# Patient Record
Sex: MALE | Race: BLACK OR AFRICAN AMERICAN | Employment: FULL TIME | URBAN - METROPOLITAN AREA
[De-identification: names, ages, dates, MRNs, and addresses within clinical notes are randomized per-mention and may not be internally consistent; named-entity substitution may affect disease eponyms.]

---

## 2021-12-13 ENCOUNTER — OFFICE VISIT (OUTPATIENT)
Dept: FAMILY MEDICINE CLINIC | Age: 31
End: 2021-12-13
Payer: COMMERCIAL

## 2021-12-13 VITALS
TEMPERATURE: 97 F | HEART RATE: 74 BPM | DIASTOLIC BLOOD PRESSURE: 78 MMHG | SYSTOLIC BLOOD PRESSURE: 127 MMHG | OXYGEN SATURATION: 96 % | HEIGHT: 70 IN | WEIGHT: 227 LBS | BODY MASS INDEX: 32.5 KG/M2 | RESPIRATION RATE: 18 BRPM

## 2021-12-13 DIAGNOSIS — Z78.9 HISTORY OF INCARCERATION: ICD-10-CM

## 2021-12-13 DIAGNOSIS — Z11.4 ENCOUNTER FOR SCREENING FOR HIV: ICD-10-CM

## 2021-12-13 DIAGNOSIS — Z11.59 ENCOUNTER FOR HEPATITIS C SCREENING TEST FOR LOW RISK PATIENT: ICD-10-CM

## 2021-12-13 DIAGNOSIS — K64.4 EXTERNAL HEMORRHOID, BLEEDING: ICD-10-CM

## 2021-12-13 DIAGNOSIS — Z76.89 ENCOUNTER TO ESTABLISH CARE: Primary | ICD-10-CM

## 2021-12-13 DIAGNOSIS — K92.1 MELENA: ICD-10-CM

## 2021-12-13 DIAGNOSIS — Z72.0 TOBACCO USE: ICD-10-CM

## 2021-12-13 PROCEDURE — 36415 COLL VENOUS BLD VENIPUNCTURE: CPT | Performed by: FAMILY MEDICINE

## 2021-12-13 PROCEDURE — 99203 OFFICE O/P NEW LOW 30 MIN: CPT | Performed by: FAMILY MEDICINE

## 2021-12-13 PROCEDURE — G0463 HOSPITAL OUTPT CLINIC VISIT: HCPCS | Performed by: FAMILY MEDICINE

## 2021-12-13 PROCEDURE — 99212 OFFICE O/P EST SF 10 MIN: CPT | Performed by: FAMILY MEDICINE

## 2021-12-13 RX ORDER — DIAPER,BRIEF,INFANT-TODD,DISP
EACH MISCELLANEOUS
Qty: 30 G | Refills: 1 | Status: SHIPPED | OUTPATIENT
Start: 2021-12-13 | End: 2021-12-20

## 2021-12-13 RX ORDER — POLYETHYLENE GLYCOL 3350 17 G/17G
17 POWDER, FOR SOLUTION ORAL DAILY
Qty: 510 G | Refills: 0 | Status: SHIPPED | OUTPATIENT
Start: 2021-12-13 | End: 2022-01-12

## 2021-12-13 ASSESSMENT — ENCOUNTER SYMPTOMS
WHEEZING: 0
NAUSEA: 0
ANAL BLEEDING: 1
DIARRHEA: 0
SHORTNESS OF BREATH: 0
RECTAL PAIN: 1
BLOOD IN STOOL: 1
COUGH: 0
ABDOMINAL PAIN: 1
VOMITING: 0
CONSTIPATION: 1

## 2021-12-13 ASSESSMENT — PATIENT HEALTH QUESTIONNAIRE - PHQ9
SUM OF ALL RESPONSES TO PHQ9 QUESTIONS 1 & 2: 0
SUM OF ALL RESPONSES TO PHQ QUESTIONS 1-9: 0
2. FEELING DOWN, DEPRESSED OR HOPELESS: 0
SUM OF ALL RESPONSES TO PHQ QUESTIONS 1-9: 0
SUM OF ALL RESPONSES TO PHQ QUESTIONS 1-9: 0
1. LITTLE INTEREST OR PLEASURE IN DOING THINGS: 0

## 2021-12-13 NOTE — PROGRESS NOTES
S: 27 y.o. male with history of incarceration. To establish care. Rectal bleeding, abdominal pain. Dark and bloody stools. Abdominal pain, cramping, 2-3 BMs per day. Some rectal pain. No personal or family history of IBD or CA. No NSAIDs. No previous scopes. More than one year. Tobacco use. Thinking of quitting, declines assistance. No current drugs. THC past.  Sexually active one female partner. O: VS: /78 (Site: Left Upper Arm, Position: Sitting, Cuff Size: Large Adult)   Pulse 74   Temp 97 °F (36.1 °C) (Temporal)   Resp 18   Ht 5' 10\" (1.778 m)   Wt 227 lb (103 kg)   SpO2 96%   BMI 32.57 kg/m²    General: NAD, appropriate affect and grooming   CV:  RRR, no gallops, rubs, or murmurs   Resp: CTAB   Abd:  Soft, mild tenderness RLQ. Ext:  No edema  Impression: Rectal bleeding   Plan: Check labs, CBC, Hep C, HIV, referral to surgery. Bowel regimen. Close follow up, 4-6 weeks. Attending Physician Statement  I have discussed the case, including pertinent history and exam findings with the resident. I also have seen the patient and performed key portions of the examination. I agree with the documented assessment and plan.

## 2021-12-13 NOTE — PROGRESS NOTES
[facility]  FAMILY MEDICINE RESIDENCY PROGRAM  DATE OF VISIT : 2021    Patient : Glenis Found   Age : 27 y.o.  : 1990   MRN : <[id number]>   ______________________________________________________________________    Chief Complaint :   Chief Complaint   Patient presents with   Weston Fallow Patient    Rectal Bleeding    Abdominal Pain       HPI : Glenis Found is 27 y.o. male who presented to the clinic today for establishment of care. Blood in stool:  Tarry stools, blood mixed in with stool. Over 1 year now. Has been occurring more frequently. Some abdominal pain, cramping. No N/V. Three times a day bowel movements. Some constipation, no diarrhea. No burning. Some rectal pain after BMs. Varying location, tends to be LUQ at times though. No surgeries, no scopes. No history of IBD, IBS. No [address] IBD. No dizziness, chest pain, shortness of breath. No fiber supplement. No frequent NSAIDs. Tobacco use:  Smokes 4 to 5 cigarettes or cigars a day. Contemplative towards quitting, has quit in the past.  Declines assistance at this time. Patient's medications, allergies, past medical, surgical, social and family histories were reviewed and updated as appropriate. Past Medical History :  Past Medical History:   Diagnosis Date    Cardiac murmur     \"innocent as child\"     No past surgical history on file. Review of Systems :    ROS - Per HPI   Review of Systems   Constitutional: Negative for chills, fever and unexpected weight change. Respiratory: Negative for cough, shortness of breath and wheezing. Cardiovascular: Negative for chest pain, palpitations and leg swelling. Gastrointestinal: Positive for abdominal pain, anal bleeding, blood in stool, constipation and rectal pain. Negative for diarrhea, nausea and vomiting. Genitourinary: Negative for dysuria, frequency and hematuria. Skin: Negative for pallor and rash.    Neurological: Negative for dizziness, syncope, light-headedness and headaches. Psychiatric/Behavioral: Negative for behavioral problems. The patient is not nervous/anxious. ______________________________________________________________________    Physical Exam :    Wt Readings from Last 3 Encounters:   12/13/21 227 lb (103 kg)   01/31/17 198 lb (89.8 kg)   08/06/16 190 lb (86.2 kg)       BMI Readings from Last 3 Encounters:   12/13/21 32.57 kg/m²   01/31/17 28.41 kg/m²   08/06/16 27.26 kg/m²   ]      Vitals: /78 (Site: Left Upper Arm, Position: Sitting, Cuff Size: Large Adult)   Pulse 74   Temp 97 °F (36.1 °C) (Temporal)   Resp 18   Ht 5' 10\" (1.778 m)   Wt 227 lb (103 kg)   SpO2 96%   BMI 32.57 kg/m²     Physical Exam  Constitutional:       General: He is not in acute distress. Appearance: He is well-developed. HENT:      Head: Normocephalic and atraumatic. Eyes:      Conjunctiva/sclera: Conjunctivae normal.      Pupils: Pupils are equal, round, and reactive to light. Neck:      Thyroid: No thyromegaly. Trachea: No tracheal deviation. Cardiovascular:      Rate and Rhythm: Normal rate and regular rhythm. Heart sounds: Normal heart sounds. No murmur heard. Pulmonary:      Effort: Pulmonary effort is normal. No respiratory distress. Breath sounds: Normal breath sounds. No wheezing or rales. Abdominal:      General: Bowel sounds are normal. There is no distension. Palpations: Abdomen is soft. There is no mass. Tenderness: There is abdominal tenderness (mild, RLQ). There is no guarding or rebound. Hernia: No hernia is present. Genitourinary:     Comments: Dr. Huitron Pall present as chaperone. External hemorrhoid noted at 6 o'clock position. No internal hemorrhoids appreciated. Scant stool sample obtained, guaiac negative  Musculoskeletal:         General: Normal range of motion. Cervical back: Normal range of motion and neck supple. Lymphadenopathy:      Cervical: No cervical adenopathy. Skin:     General: Skin is warm and dry. Capillary Refill: Capillary refill takes less than 2 seconds. Findings: No rash. Neurological:      Mental Status: He is alert and oriented to person, place, and time. Cranial Nerves: No cranial nerve deficit. Deep Tendon Reflexes: Reflexes normal.   Psychiatric:         Behavior: Behavior normal.         ______________________________________________________________________    Assessment & Plan :     Diagnosis Orders   1. Encounter to establish care     2. Roney Kapoor MD, General Surgery, [place]    CBC   3. Tobacco use     4. Encounter for hepatitis C screening test for low risk patient  HEPATITIS C ANTIBODY   5. Encounter for screening for HIV  HIV Screen   6. External hemorrhoid, bleeding  Rebeca Schilling, Dayn Jang MD, General Surgery, [place]    polyethylene glycol [facility]) 17 GM/SCOOP powder    hydrocortisone (ALA-YENIFER) 1 % cream   7. History of incarceration         Establishment of care: Past medical, surgical, family, social history reviewed and documented and updated in chart as appropriate. Labs as ordered. Blood in stool/pain: External hemorrhoid noted on examination, other differentials including IBD versus IBS versus ulcer. Preparation H and MiraLAX sent to pharmacy for symptomatic relief and bowel regularity. Referral to general surgery for possible scopes. Check CBC to evaluate hemoglobin level and establish baseline. Tobacco use: Declines assistance with cessation at this time. Health maintenance: Received Covid immunizations while incarcerated, hep C and HIV screen      Follow up:  Return in about 6 weeks (around 1/24/2022) for follow up on symptoms.       Juliann York MD PGY-3    Discussed with: Dr. Wili Morton

## 2021-12-14 ENCOUNTER — TELEPHONE (OUTPATIENT)
Dept: SURGERY | Age: 31
End: 2021-12-14

## 2021-12-22 ENCOUNTER — HOSPITAL ENCOUNTER (EMERGENCY)
Age: 31
Discharge: HOME | End: 2021-12-22
Payer: COMMERCIAL

## 2021-12-22 VITALS
DIASTOLIC BLOOD PRESSURE: 85 MMHG | SYSTOLIC BLOOD PRESSURE: 125 MMHG | HEART RATE: 62 BPM | RESPIRATION RATE: 14 BRPM | TEMPERATURE: 98 F | OXYGEN SATURATION: 96 %

## 2021-12-22 DIAGNOSIS — R36.9 PENILE DISCHARGE: ICD-10-CM

## 2021-12-22 DIAGNOSIS — Z20.2 STD EXPOSURE: Primary | ICD-10-CM

## 2021-12-22 PROCEDURE — 99284 EMERGENCY DEPT VISIT MOD MDM: CPT

## 2021-12-22 PROCEDURE — 87491 CHLMYD TRACH DNA AMP PROBE: CPT

## 2021-12-22 PROCEDURE — 6370000000 HC RX 637 (ALT 250 FOR IP): Performed by: PHYSICIAN ASSISTANT

## 2021-12-22 PROCEDURE — 86803 HEPATITIS C AB TEST: CPT

## 2021-12-22 PROCEDURE — 87591 N.GONORRHOEAE DNA AMP PROB: CPT

## 2021-12-22 PROCEDURE — 6360000002 HC RX W HCPCS: Performed by: PHYSICIAN ASSISTANT

## 2021-12-22 PROCEDURE — 86703 HIV-1/HIV-2 1 RESULT ANTBDY: CPT

## 2021-12-22 PROCEDURE — 96372 THER/PROPH/DIAG INJ SC/IM: CPT

## 2021-12-22 RX ORDER — AZITHROMYCIN 250 MG/1
1000 TABLET, FILM COATED ORAL ONCE
Status: COMPLETED | OUTPATIENT
Start: 2021-12-22 | End: 2021-12-22

## 2021-12-22 RX ORDER — CEFTRIAXONE 1 G/1
500 INJECTION, POWDER, FOR SOLUTION INTRAMUSCULAR; INTRAVENOUS ONCE
Status: COMPLETED | OUTPATIENT
Start: 2021-12-22 | End: 2021-12-22

## 2021-12-22 RX ADMIN — CEFTRIAXONE 500 MG: 1 INJECTION, POWDER, FOR SOLUTION INTRAMUSCULAR; INTRAVENOUS at 12:51

## 2021-12-22 RX ADMIN — AZITHROMYCIN 1000 MG: 250 TABLET, FILM COATED ORAL at 12:51

## 2021-12-22 NOTE — ED PROVIDER NOTES
[facility]       Department of Emergency Medicine   ED  Provider Note  Admit Date/RoomTime: 12/22/2021 12:15 PM  ED Room: [place] [address]        Date/Time Seen:  12/22/21    Chief Complaint:  Exposure to STD      History of Present Illness:  Source of history provided by:  patient. History/Exam Limitations: none. Mary Villeda is a 27 y.o. old male presenting to the emergency department for possible exposure to STD with dysuria and penile discharge, which occured 1 week(s) prior to arrival.  Since exposure/onset the symptoms have been persistent and mild-moderate in severity. He denies any genital rash, genital ulcers, scrotal mass or scrotal pain. His prior STD history: GC and chlamydia. Review of Systems:     Pertinent positives and negatives are stated within HPI, all other systems reviewed and are negative. Past Medical History:  has a past medical history of Cardiac murmur. Past Surgical History:  has a past surgical history that includes [place] tooth extraction. Social History:  reports that he has been smoking cigars. He has never used smokeless tobacco. He reports current drug use. Drug: Marijuana [place]). He reports that he does not drink alcohol. Family History: family history includes Other in his father. Allergies: Patient has no known allergies. Physical Exam:  (Vital signs reviewed)  Constitutional:  Alert, development consistent with age. Eyes:  PERRL, EOMI, no discharge or conjunctival injection. Ears:  External ears without lesions. Throat:  Pharynx without injection, exudate, or tonsillar hypertrophy. Airway patient. Neck:  Normal ROM. Supple. Lungs:  Clear to auscultation and breath sounds equal.  Heart:  Regular rate and rhythm, normal heart sounds, without pathological murmurs, ectopy, gallops, or rubs. Abdomen:  Soft, nontender, good bowel sounds. No firm or pulsatile mass. Genitalia Exam:        Penis: declined per patient. Scrotum: declined per patient. Testicle: declined per patient. Skin:  Normal turgor. Warm, dry, without visible rash, unless noted elsewhere. Neurological:  Orientation age-appropriate. Motor functions intact.    -------------------Nursing Notes / Prior Records & Vitals Reviewed Section----------------------   (The nursing notes within the ED encounter, home medications, current encounter or past encounter records and vital signs as below have been reviewed)   /85   Pulse 62   Temp 98 °F (36.7 °C) (Oral)   Resp 14   SpO2 96%   Oxygen Saturation Interpretation: Normal.  -------------------------------------------Test Results Section---------------------------------------------  (All laboratory and radiology results have been personally reviewed by myself)  Laboratory:  Results for orders placed or performed during the hospital encounter of 12/22/21   C.trachomatis N.gonorrhoeae DNA, Urine    Specimen: Urine   Result Value Ref Range    Source Urine        Radiology: All Radiology results interpreted by Radiologist unless otherwise noted. No orders to display     -----------------------------ED Course / Medical Decision Making Section--------------------------  ED Course Medications:  Medications   azithromycin (ZITHROMAX) tablet 1,000 mg (1,000 mg Oral Given 12/22/21 1251)   cefTRIAXone (ROCEPHIN) injection 500 mg (500 mg IntraMUSCular Given 12/22/21 1251)         Medical Decision Making:    Concerns for STD, based on concerns for hx, will treat for suspected STD and provide outpatient follow-up instructions. Counseled on safe sex practices, recommended importance for further STD/HIV/ testing by PCP and importance of PCP follow up. Patient expressed an understanding    Counseling: The emergency provider has spoken with the patient and discussed todays results, in addition to providing specific details for the plan of care and counseling regarding the diagnosis and prognosis.   Questions are answered at this time and they are agreeable with the plan.  ------------------------------------Impression & Disposition Section------------------------------------  Impression(s):  1. STD exposure    2. Penile discharge      Disposition:  Disposition: Discharge to home  Patient condition is good    Discharge Medication List as of 12/22/2021  1:17 PM        * NOTE: This report was transcribed using voice recognition software. Every effort was made to ensure accuracy; however, inadvertent computerized transcription errors may be present.         Gela Bolanos, [place]  12/22/21 1535

## 2021-12-23 LAB
HEPATITIS C ANTIBODY INTERPRETATION: NORMAL
HIV-1 AND HIV-2 ANTIBODIES: NORMAL

## 2021-12-27 LAB
C. TRACHOMATIS DNA ,URINE: NEGATIVE
N. GONORRHOEAE DNA, URINE: NEGATIVE
SOURCE: NORMAL

## 2022-01-06 ENCOUNTER — TELEPHONE (OUTPATIENT)
Dept: SURGERY | Age: 32
End: 2022-01-06

## 2022-01-06 ENCOUNTER — PREP FOR PROCEDURE (OUTPATIENT)
Dept: SURGERY | Age: 32
End: 2022-01-06

## 2022-01-06 ENCOUNTER — OFFICE VISIT (OUTPATIENT)
Dept: SURGERY | Age: 32
End: 2022-01-06
Payer: COMMERCIAL

## 2022-01-06 VITALS
SYSTOLIC BLOOD PRESSURE: 144 MMHG | RESPIRATION RATE: 16 BRPM | BODY MASS INDEX: 32.64 KG/M2 | HEIGHT: 70 IN | TEMPERATURE: 99.5 F | WEIGHT: 228 LBS | OXYGEN SATURATION: 96 % | HEART RATE: 77 BPM | DIASTOLIC BLOOD PRESSURE: 84 MMHG

## 2022-01-06 DIAGNOSIS — K62.5 RECTAL BLEEDING: Primary | ICD-10-CM

## 2022-01-06 DIAGNOSIS — K59.1 FUNCTIONAL DIARRHEA: ICD-10-CM

## 2022-01-06 DIAGNOSIS — R10.30 LOWER ABDOMINAL PAIN: ICD-10-CM

## 2022-01-06 PROBLEM — Z78.9 HISTORY OF INCARCERATION: Status: RESOLVED | Noted: 2021-12-13 | Resolved: 2022-01-06

## 2022-01-06 PROBLEM — K64.4 EXTERNAL HEMORRHOID, BLEEDING: Status: RESOLVED | Noted: 2021-12-13 | Resolved: 2022-01-06

## 2022-01-06 PROCEDURE — 99202 OFFICE O/P NEW SF 15 MIN: CPT | Performed by: SURGERY

## 2022-01-06 PROCEDURE — 99203 OFFICE O/P NEW LOW 30 MIN: CPT | Performed by: SURGERY

## 2022-01-06 PROCEDURE — G0463 HOSPITAL OUTPT CLINIC VISIT: HCPCS | Performed by: SURGERY

## 2022-01-06 PROCEDURE — 99212 OFFICE O/P EST SF 10 MIN: CPT | Performed by: SURGERY

## 2022-01-06 RX ORDER — SODIUM CHLORIDE 9 MG/ML
25 INJECTION, SOLUTION INTRAVENOUS PRN
Status: CANCELLED | OUTPATIENT
Start: 2022-01-06

## 2022-01-06 RX ORDER — BISACODYL 5 MG
TABLET, DELAYED RELEASE (ENTERIC COATED) ORAL
Qty: 8 TABLET | Refills: 0 | Status: SHIPPED
Start: 2022-01-06 | End: 2022-03-10

## 2022-01-06 RX ORDER — SODIUM CHLORIDE 0.9 % (FLUSH) 0.9 %
10 SYRINGE (ML) INJECTION EVERY 12 HOURS SCHEDULED
Status: CANCELLED | OUTPATIENT
Start: 2022-01-06

## 2022-01-06 RX ORDER — SODIUM CHLORIDE 0.9 % (FLUSH) 0.9 %
10 SYRINGE (ML) INJECTION PRN
Status: CANCELLED | OUTPATIENT
Start: 2022-01-06

## 2022-01-06 RX ORDER — SODIUM CHLORIDE, SODIUM LACTATE, POTASSIUM CHLORIDE, CALCIUM CHLORIDE 600; 310; 30; 20 MG/100ML; MG/100ML; MG/100ML; MG/100ML
INJECTION, SOLUTION INTRAVENOUS CONTINUOUS
Status: CANCELLED | OUTPATIENT
Start: 2022-01-06

## 2022-01-06 NOTE — Clinical Note
See my office note from today on your patient. Thanks!!     Electronically signed by Antonietta Phan MD on 1/6/2022 at 3:47 PM

## 2022-01-06 NOTE — PROGRESS NOTES
History and Physical    Patient's Name/Date of Birth: Rosaura White /1990, (27 y.o.), male    Date: January 6, 2022     Assessment/Plan:  1. Rectal bleeding, intermittent lower abdominal pain, and chronic diarrhea - I did not see or feel any external hemorrhoids or palpate any internal hemorrhoids. I am not sure that the Miralax is helpful at this point. I recommended diagnostic colonoscopy with possible biopsy or polypectomy and explained the risk, benefits, expected outcome, and alternatives to the procedure. Risks included but are not limited to bleeding, infection, respiratory distress, hypotension, and perforation of the colon. The patient understands and is in agreement. 2. Heartburn and indigestion - I recommended esophagogastroduodenoscopy with possible biopsy and explained the risk, benefits, expected outcome, and alternatives to the procedure. Risks included but are not limited to bleeding, infection, respiratory distress, hypotension, and perforation of the esophagus, stomach, or duodenum. Patient understands and is in agreement. 3. Tobacco abuse    Chief Complaint   Patient presents with    Melena     pt states that for the last 6 months he has been having issues with black stools    Hemorrhoids     pt states that he has been having issues for the last 6 months as well       HPI:   Patient was seen in the office today for 6-month history of rectal bleeding. He has been having dark to bright red blood that is in the commode mixed with the stool as well as in the water and also on the tissue when he wipes. This is been going on for about 6 months. Initially has been intermittent but over the last month has been with nearly every bowel movement. He also has intermittent lower abdominal pain that migrates from right lower quadrant to the left lower quadrant. It occurs a few times a week but only lasts a few minutes at a time. He rates the severity at a 6 out of 10.   He has 3-4 BMs per day ranging from formed stool to liquidy stool. He was started on Miralax 17 g p.o. daily about 1 month ago however this is not changed his bowel habits significantly. He denied any family history of colon cancer or colon polyps. He has intermittent heartburn or indigestion a few times a week but is not taking thing for it. He denies any nausea, vomiting, upper abdominal pain, or weight loss. Past Medical History:   Diagnosis Date    Cardiac murmur     \"innocent as child\"       Past Surgical History:   Procedure Laterality Date    WISDOM TOOTH EXTRACTION      general anesthesia, tolerated well       Current Outpatient Medications   Medication Sig Dispense Refill    Multiple Vitamins-Minerals (VITAMIN-MINERAL SUPPLEMENT PO) Take 1 tablet by mouth daily      hydrocortisone 2.5 % cream Apply 1 applicator topically once Apply to anal area daily      polyethylene glycol (GLYCOLAX) 17 GM/SCOOP powder Take 17 g by mouth daily 510 g 0     No current facility-administered medications for this visit. No Known Allergies    Review of Systems  Non-contributory    Physical Exam:  Vitals:    01/06/22 1421   BP: (!) 144/84   Site: Left Upper Arm   Position: Sitting   Cuff Size: Large Adult   Pulse: 77   Resp: 16   Temp: 99.5 °F (37.5 °C)   TempSrc: Infrared   SpO2: 96%   Weight: 228 lb (103.4 kg)   Height: 5' 10\" (1.778 m)       Body mass index is 32.71 kg/m². Physical Exam  Constitutional:       General: He is not in acute distress. Appearance: He is well-developed. He is not diaphoretic. HENT:      Head: Normocephalic and atraumatic. Eyes:      General:         Right eye: No discharge. Left eye: No discharge. Cardiovascular:      Rate and Rhythm: Normal rate and regular rhythm. Heart sounds: Normal heart sounds. No murmur heard. No friction rub. No gallop. Pulmonary:      Effort: Pulmonary effort is normal. No respiratory distress. Breath sounds: Normal breath sounds.  No wheezing or rales. Chest:      Chest wall: No tenderness. Abdominal:      General: Bowel sounds are normal. There is no distension. Palpations: Abdomen is soft. Abdomen is not rigid. There is no mass. Tenderness: There is abdominal tenderness (mild RLQ and LLQ tenderness to palpation). There is no guarding or rebound. Hernia: There is no hernia in the ventral area, left inguinal area or right inguinal area. Genitourinary:     Penis: Normal.       Testes:         Right: Mass, tenderness or swelling not present. Left: Mass, tenderness or swelling not present. Prostate: Not enlarged and not tender. Rectum: Guaiac stool: no stool available for hemoccult testing. No mass, tenderness, anal fissure, external hemorrhoid or internal hemorrhoid. Normal anal tone. Musculoskeletal:         General: No deformity. Normal range of motion. Cervical back: Normal range of motion. Skin:     General: Skin is warm and dry. Coloration: Skin is not pale. Findings: No erythema or rash. Neurological:      Mental Status: He is alert and oriented to person, place, and time.    Psychiatric:         Behavior: Behavior normal.         Judgment: Judgment normal.     Electronically signed by Joel Vazquez MD on 1/6/22 at 2:42 PM EST

## 2022-01-06 NOTE — PROGRESS NOTES
Scheduled pt for Colonoscopy on 2/22/22 at 8:15 AM. Pt needs to arrive at [address] at 7:00 AM. Mariano Schneider pt directions and instruction sheet in office. Pt accepted date/time and verbalized understanding.     Electronically signed by Carrolyn Siemens on 1/6/22 at 3:29 PM EST

## 2022-01-06 NOTE — PATIENT INSTRUCTIONS
Dr. Jerad Haynes recommended upper GI endoscopy and colonoscopy with possible biopsy or polypectomy and he explained the risk, benefits, expected outcome, and alternatives to the procedure. Risks included but are not limited to bleeding, infection, respiratory distress, hypotension, and perforation of the esophagus, stomach, small intestine, or colon. You understood and were in agreement. You will need to have someone bring you to the hospital and take you home because you will not be able to drive or work the rest of that day. Also, you need to have someone stay with you the rest of the day to make sure you do not develop any complications. [facility] General Surgery  MAGNESIUM CITRATE/DULCOLAX TABLETS  COLON PREP FOR COLONOSCOPY OR COLON SURGERY    It is very important that you follow all of the instructions listed on this sheet carefully (they may be slightly different than the directions on the product that you purchase at the pharmacy) to ensure that your colon is adequately cleaned out or your risk of complications could be increased. 2 Days or More Before Endoscopy:   Obtain three 10-ounce bottle of Magnesium Citrate and 1 bottle of Dulcolax tablets from the pharmacy.  Do not eat corn, tomatoes, peas or watermelon 5 days before procedure.  If you are on INSULIN or OTHER DIABETIC MEDICATIONS, then check with your primary care physician as to how to adjust your medication while on clear liquid diet and when nothing by mouth. 1 Day Before the Endoscopy:   No solid food  only clear liquids (soup, jello, or juice that you can see through with no solid food) for breakfast, lunch and supper. DO NOT drink or eat anything that is red as it will turn the inside of the colon red and look like blood.  Have at least 8 oz or more of clear liquids for breakfast (7 am to 8 am) and lunch (11:30 am to 12:30 pm).    12 Noon Drink a 10 oz bottle of Magnesium Citrate and 4 Dulcolax tablets followed immediately by at least 8 oz of clear liquids.  1:00 pm Drink at least 8 oz of clear liquids.  2:00 pm Take 4 Dulcolax tablets and drink at least 8 oz of clear liquids.  3:00 pm Drink at least 8 oz of clear liquids.  4:00 pm Drink a 10 oz bottle of Magnesium Citrate followed immediately by at least 8 oz of clear liquids.  5:00 pm Drink at least 8 oz of clear liquids.  Can continue to take liquids until 12 midnight then nothing to eat or drink except as instructed below    Day of Endoscopy:   4 hours prior to scheduled time for colonoscopy, drink a 10 oz bottle of Magnesium Citrate followed immediately by at least 8 oz of clear liquids. Then nothing to drink after that.  If any blood pressure medications or heart medications are due in the morning, you should take them with a sip of water. Patient Information and Instructions for  Upper GI Endoscopy or Esophagogastroduodenoscopy [EGD])         Definition Upper GI Endoscopy or Esophagogastroduodenoscopy [EGD])  This is a test that uses a fiberoptic scope to examine the esophagus (throat), stomach, and upper part of the small intestines. Upper GI endoscopy may be recommended if you have:   Abdominal pain   Severe heartburn   Persistent nausea and vomiting   Difficulty swallowing   Blood in stool or vomit   Abnormal x-ray or other examinations of the gastrointestinal tract     Conditions that can be diagnosed with upper GI endoscopy include:   Ulcers   Tumors   Polyps   Abnormal narrowing   Inflammation     Possible Complications   Complications are rare, but no procedure is completely free of risk.  If you are planning to have upper GI endoscopy, your doctor will review a list of possible complications, which may include:   Bleeding   Damage to the esophagus, stomach, or intestine   Infection   Respiratory depression (reduced breathing rate and/or depth)   Reaction to sedatives or anesthesia causing your blood pressure to drop    Some factors that may increase the risk of complications include:   Age: 61 or older   Pregnancy   Obesity   Smoking , alcoholism , or drug use   Malnutrition   Recent illness   Diabetes   Heart or lung problems   Bleeding disorders   Use of certain medicines     Be sure to discuss these risks with your doctor before the test.     What to Expect   Prior to test   Leading up to the test:   Arrange for a ride home after the test. Also, arrange for help at home. The night before, eat a light meal.  Do not eat or drink anything after midnight the night before the test.   Talk to your doctor about your medicines. You may be asked to stop taking some medicines up to one week before the procedure, like:   Anti-inflammatory drugs (e.g., aspirin )   Blood thinners, like clopidogrel (Plavix) or warfarin (Coumadin)     Description of the Test   You will be asked to lie on your left side. You will have monitors tracking your breathing, heart rate, and blood oxygen levels. You will be given supplemental oxygen to breathe through your nose. A mouthpiece will be positioned to help keep your mouth open. Your throat may be sprayed with a numbing medicine. You will be given a sedative through an IV to help you relax during the test.  During the test, a small suction tube will be used to clear saliva and fluids from your mouth. The endoscope will be lubricated and placed in your mouth. You will be asked to try to swallow it. Then, it will be carefully and slowly advanced down your throat. It will be passed through your esophagus and into your stomach and intestine. While the endoscope is being advanced, your doctor will view the images on the screen. Air will be passed through the endoscope into your digestive tract. This will be done to smooth the normal folds in the tissues, allowing your doctor to view the tissue more easily. Tiny tools may be passed through the endoscope in order to take biopsies or do other tests.      After Test After the test, you will be observed for an hour. Then, you will be allowed to go home. When you return home after the test, do the following to help ensure a smooth recovery:   Rest when you get home. Ask your doctor if you can resume your normal diet. In most cases, you will be able to. Sedatives can slow your reaction time. Do not drive or use machinery for the rest of the day. Avoid alcohol for the rest of the day. Be sure to follow your doctor's instructions . How Long Will It Take? Usually about 10-15 minutes     Will It Hurt? Most people do not feel anything during the test and will not remember the test.  After the test, your throat may be sore and you may feel bloated. Results   This test gives your doctor information about the health of your digestive system. The results can help to explain your symptoms. You and your doctor will talk about the results and your treatment plan. Call Your Doctor   After the test, call your doctor if any of the following occurs:   Signs of infection, including fever and chills   Severe abdominal pain   Hard, swollen abdomen   Difficulty swallowing or breathing   Any change or increase in your original symptoms   Bloody or black tarry colored stools   Nausea and/or vomiting   Cough, shortness of breath, or chest pain   Bleeding     In case of emergency, call 911. Patient Information and Instructions for Colonoscopy         Definition of Colonoscopy   A colonoscopy is the visual exam of the rectum and colon (large intestine). The exam is done with a tool called a colonoscope. The colonoscope is a flexible tube with a tiny camera on the end. This instrument allows the doctor to view the inside of your rectum and colon. Sigmoidoscopy is a shorter scope that views only the last one third of the colon. Reasons for Colonoscopy   It is used to examine, diagnose, and treat problems in your large intestine.  The procedure is most often done for the following reasons: To determine the cause of abdominal pain, rectal bleeding, or a change in bowel habits   To detect and treat colon cancer or colon polyps   To obtain tissue samples for testing   To stop intestinal bleeding   Monitor response to treatment if you have inflammatory bowel disease     Possible Complications   Complications are rare, but no procedure is completely free of risk. If you are planning to have a colonoscopy, your doctor will review a list of possible complications, which may include:   Bleeding   Reaction to the sedation causing drop in your blood pressure or problems breathing  Perforation or puncture of the bowel     Factors that may increase the risk of complications include:   Pre-existing heart or kidney condition   Treatment with certain medicines, including aspirin and other drugs with anticoagulant or blood-thinning properties   Prior abdominal surgery or radiation treatments   Active colitis , diverticulitis , or other acute bowel disease   Previous treatment with radiation therapy     Be sure to discuss these risks with your doctor before the procedure. What to Expect   Prior to Procedure   Your doctor will likely do the following:   Physical exam   Health history   Review of medicines   Test your stool for hidden blood (called \"occult blood\")     Your colon must be completely clean before the procedure. Any stool left in the intestine will block the view. This preparation may start several days before the procedure. Follow your doctor's instructions. Leading up to your procedure:   Talk to your doctor about your medicines.  You may be asked to stop taking some medicines up to one week before the procedure, like:   Anti-inflammatory drugs (e.g., aspirin )   Blood thinners like clopidogrel (Plavix) or warfarin (Coumadin)   Iron supplements or vitamins containing iron   The day or days before your procedure, go on a clear liquid diet (clear broth, clear juice, clear jello) with no red coloring  Do not eat or drink anything after midnight. Wear comfortable clothing. If you have diabetes, ask your doctor if you need to adjust your diabetes medicine on the day prior to your procedure and the day of your procedure. Arrange for a ride home after the procedure. Anesthesia   You will receive intravenous sedation medicine for the procedure so you will not feel anything during the procedure. Description of the Procedure   You will lie on your left side with knees bent and drawn up toward your chest. The colonoscope will be slowly inserted through the rectum and into the bowel. The colonoscope will inject air into the colon. A small attached video camera will allow the doctor to view the colon's lining on a screen. The doctor will continue guiding the tool through the bowel and assess the lining. A tissue sample or polyps may be removed during the procedure. How Long Will It Take? Usually it takes about 30 to 45 minutes     Will It Hurt? Most people do not feel anything during the procedure and will not remember the procedure. After the procedure, gas pains and cramping are common. These pains should go away with the passing of gas. Post-procedure Care   If any tissue was removed: It will be sent to a lab to be examined. It may take 1-2 weeks for results. The doctor will usually give an initial report after the scope is removed. Other tests may be recommended. A small amount of bleeding may occur during the first few days after the procedure. When you return home after the procedure, be sure to follow your doctor's instructions, which may include:   Resume medicines as instructed by your doctor. Resume normal diet, unless directed otherwise by your doctor. The sedative will make you drowsy. Avoid driving, operating machinery, or making important decisions for the rest of the day. Rest for the remainder of the day.      After arriving home, contact your doctor if any of the following occurs:   Bleeding from your rectum, notify your doctor if you pass a teaspoonful of blood or more. Black, tarry stools   Severe abdominal pain   Hard, swollen abdomen   Signs of infection, including fever or chills   Inability to pass gas or stool   Coughing, shortness of breath, chest pain, severe nausea or vomiting     In case of an emergency, CALL 911 .

## 2022-01-06 NOTE — H&P
History and Physical    Patient's Name/Date of Birth: Lien dEwards /1990, (27 y.o.), male    Date: January 6, 2022     Assessment/Plan:  1. Rectal bleeding, intermittent lower abdominal pain, and chronic diarrhea - I did not see or feel any external hemorrhoids or palpate any internal hemorrhoids. I am not sure that the Miralax is helpful at this point. I recommended diagnostic colonoscopy with possible biopsy or polypectomy and explained the risk, benefits, expected outcome, and alternatives to the procedure. Risks included but are not limited to bleeding, infection, respiratory distress, hypotension, and perforation of the colon. The patient understands and is in agreement. 2. Heartburn and indigestion - I recommended esophagogastroduodenoscopy with possible biopsy and explained the risk, benefits, expected outcome, and alternatives to the procedure. Risks included but are not limited to bleeding, infection, respiratory distress, hypotension, and perforation of the esophagus, stomach, or duodenum. Patient understands and is in agreement. 3. Tobacco abuse    Chief Complaint   Patient presents with    Melena     pt states that for the last 6 months he has been having issues with black stools    Hemorrhoids     pt states that he has been having issues for the last 6 months as well       HPI:   Patient was seen in the office today for 6-month history of rectal bleeding. He has been having dark to bright red blood that is in the commode mixed with the stool as well as in the water and also on the tissue when he wipes. This is been going on for about 6 months. Initially has been intermittent but over the last month has been with nearly every bowel movement. He also has intermittent lower abdominal pain that migrates from right lower quadrant to the left lower quadrant. It occurs a few times a week but only lasts a few minutes at a time. He rates the severity at a 6 out of 10.   He has 3-4 BMs per day ranging from formed stool to liquidy stool. He was started on Miralax 17 g p.o. daily about 1 month ago however this is not changed his bowel habits significantly. He denied any family history of colon cancer or colon polyps. He has intermittent heartburn or indigestion a few times a week but is not taking thing for it. He denies any nausea, vomiting, upper abdominal pain, or weight loss. Past Medical History:   Diagnosis Date    Cardiac murmur     \"innocent as child\"       Past Surgical History:   Procedure Laterality Date    WISDOM TOOTH EXTRACTION      general anesthesia, tolerated well       Current Outpatient Medications   Medication Sig Dispense Refill    Multiple Vitamins-Minerals (VITAMIN-MINERAL SUPPLEMENT PO) Take 1 tablet by mouth daily      hydrocortisone 2.5 % cream Apply 1 applicator topically once Apply to anal area daily      polyethylene glycol (GLYCOLAX) 17 GM/SCOOP powder Take 17 g by mouth daily 510 g 0     No current facility-administered medications for this visit. No Known Allergies    Review of Systems  Non-contributory    Physical Exam:  Vitals:    01/06/22 1421   BP: (!) 144/84   Site: Left Upper Arm   Position: Sitting   Cuff Size: Large Adult   Pulse: 77   Resp: 16   Temp: 99.5 °F (37.5 °C)   TempSrc: Infrared   SpO2: 96%   Weight: 228 lb (103.4 kg)   Height: 5' 10\" (1.778 m)       Body mass index is 32.71 kg/m². Physical Exam  Constitutional:       General: He is not in acute distress. Appearance: He is well-developed. He is not diaphoretic. HENT:      Head: Normocephalic and atraumatic. Eyes:      General:         Right eye: No discharge. Left eye: No discharge. Cardiovascular:      Rate and Rhythm: Normal rate and regular rhythm. Heart sounds: Normal heart sounds. No murmur heard. No friction rub. No gallop. Pulmonary:      Effort: Pulmonary effort is normal. No respiratory distress. Breath sounds: Normal breath sounds.  No wheezing or rales. Chest:      Chest wall: No tenderness. Abdominal:      General: Bowel sounds are normal. There is no distension. Palpations: Abdomen is soft. Abdomen is not rigid. There is no mass. Tenderness: There is abdominal tenderness (mild RLQ and LLQ tenderness to palpation). There is no guarding or rebound. Hernia: There is no hernia in the ventral area, left inguinal area or right inguinal area. Genitourinary:     Penis: Normal.       Testes:         Right: Mass, tenderness or swelling not present. Left: Mass, tenderness or swelling not present. Prostate: Not enlarged and not tender. Rectum: Guaiac stool: no stool available for hemoccult testing. No mass, tenderness, anal fissure, external hemorrhoid or internal hemorrhoid. Normal anal tone. Musculoskeletal:         General: No deformity. Normal range of motion. Cervical back: Normal range of motion. Skin:     General: Skin is warm and dry. Coloration: Skin is not pale. Findings: No erythema or rash. Neurological:      Mental Status: He is alert and oriented to person, place, and time.    Psychiatric:         Behavior: Behavior normal.         Judgment: Judgment normal.     Electronically signed by Behzad Herrera MD on 1/6/22 at 2:42 PM EST

## 2022-01-07 NOTE — TELEPHONE ENCOUNTER
Prior Authorization Form:      DEMOGRAPHICS:                     Patient Name:  Tootie Mitchell  Patient :  1990            Insurance:  Payor: [employer] / Plan: Bingkhushboo Linares / Product Type: Indemnity /   Insurance ID Number:    Payor/Plan Subscr  Sex Relation Sub. Ins. ID Effective Group Num   1.  Bing Linares - Rodolfo Crisostomo R 1990 Male Self [id number] 1/1/15 [id number]                                   [address] [place] [address], [address]         DIAGNOSIS & PROCEDURE:                       Procedure/Operation: EGD AND COLONOSCOPY           CPT Code: 50692, 14818    Diagnosis:  RECTAL BLEEDING, FUNCTIONAL DIARRHEA, LOWER ABDOMINAL PAIN    ICD10 Code: K62.5, K59.1, R10.30    Location:  [facility]    Surgeon:  DR. Patel April    SCHEDULING INFORMATION:                          Date: 22    Time: 8:15 AM              Anesthesia:  LMAC                                                       Status:  Outpatient        Special Comments:  N/A       Electronically signed by Haja Rivera on 2022 at 7:08 PM

## 2022-01-13 ENCOUNTER — OFFICE VISIT (OUTPATIENT)
Dept: FAMILY MEDICINE CLINIC | Age: 32
End: 2022-01-13
Payer: COMMERCIAL

## 2022-01-13 VITALS
WEIGHT: 230 LBS | RESPIRATION RATE: 14 BRPM | HEART RATE: 63 BPM | BODY MASS INDEX: 34.86 KG/M2 | HEIGHT: 68 IN | OXYGEN SATURATION: 98 % | DIASTOLIC BLOOD PRESSURE: 76 MMHG | TEMPERATURE: 98.1 F | SYSTOLIC BLOOD PRESSURE: 117 MMHG

## 2022-01-13 DIAGNOSIS — K62.5 RECTAL BLEEDING: ICD-10-CM

## 2022-01-13 DIAGNOSIS — K59.1 FUNCTIONAL DIARRHEA: Primary | ICD-10-CM

## 2022-01-13 PROCEDURE — G0463 HOSPITAL OUTPT CLINIC VISIT: HCPCS | Performed by: FAMILY MEDICINE

## 2022-01-13 PROCEDURE — 99213 OFFICE O/P EST LOW 20 MIN: CPT | Performed by: FAMILY MEDICINE

## 2022-01-13 PROCEDURE — 99212 OFFICE O/P EST SF 10 MIN: CPT | Performed by: FAMILY MEDICINE

## 2022-01-13 RX ORDER — POLYETHYLENE GLYCOL 3350 17 G/17G
17 POWDER, FOR SOLUTION ORAL DAILY
Qty: 510 G | Refills: 2 | Status: SHIPPED
Start: 2022-01-13 | End: 2022-04-01

## 2022-01-13 SDOH — ECONOMIC STABILITY: FOOD INSECURITY: WITHIN THE PAST 12 MONTHS, THE FOOD YOU BOUGHT JUST DIDN'T LAST AND YOU DIDN'T HAVE MONEY TO GET MORE.: NEVER TRUE

## 2022-01-13 SDOH — ECONOMIC STABILITY: FOOD INSECURITY: WITHIN THE PAST 12 MONTHS, YOU WORRIED THAT YOUR FOOD WOULD RUN OUT BEFORE YOU GOT MONEY TO BUY MORE.: NEVER TRUE

## 2022-01-13 ASSESSMENT — ENCOUNTER SYMPTOMS
COUGH: 0
SHORTNESS OF BREATH: 0
CONSTIPATION: 1
ANAL BLEEDING: 1
ABDOMINAL PAIN: 1
RECTAL PAIN: 1
BLOOD IN STOOL: 1
DIARRHEA: 0
NAUSEA: 0
WHEEZING: 0
VOMITING: 0

## 2022-01-13 ASSESSMENT — SOCIAL DETERMINANTS OF HEALTH (SDOH): HOW HARD IS IT FOR YOU TO PAY FOR THE VERY BASICS LIKE FOOD, HOUSING, MEDICAL CARE, AND HEATING?: NOT HARD AT ALL

## 2022-01-13 ASSESSMENT — LIFESTYLE VARIABLES: HOW OFTEN DO YOU HAVE A DRINK CONTAINING ALCOHOL: NEVER

## 2022-01-13 NOTE — PROGRESS NOTES
25-year-old male with no significant past medical history here for follow-up. Patient recently established care in December at that time was having some blood in stool as well as cramping abdominal pain and rectal pain over the last year. No nausea or vomiting. Bowel habits are regular with both tarry stools and blood mixed in stool. Hemoglobin was normal at that time. Patient was referred to general surgery and started on MiraLAX. Since starting MiraLAX patient reports more formed and regular bowel movements though still with some stool. Surgery did evaluate patient and is planning for EGD and colonoscopy 2/22. No new or worsening symptoms at this time. No correlation with food. Blood pressure 117/76, pulse 63, temperature 98.1 °F (36.7 °C), temperature source Temporal, resp. rate 14, height 5' 8\" (1.727 m), weight 230 lb (104.3 kg), SpO2 98 %. HEENT WNL     Heart regular    Lungs clear    abd normal bowel sounds all 4 quadrants, no tenderness, no rebound, no guarding    no edema    Pulses intact       Assessment and plan  Abdominal pain/blood in stool: Previously with external hemorrhoids noted on examination, other differential diagnosis including IBD versus IBS versus ulcer. Continue with MiraLAX, follow-up with general surgery and have scope testing performed. Further plan of care to be determined pending results of scopes. Signs and symptoms warranting emergent and urgent evaluation reviewed with patient. Return to clinic in 2 months follow-up on symptoms after colonoscopy    Attending Physician Statement  I have discussed the case, including pertinent history and exam findings with the resident. I agree with the documented assessment and plan.

## 2022-01-13 NOTE — PROGRESS NOTES
[facility]  FAMILY MEDICINE RESIDENCY PROGRAM  DATE OF VISIT : 2022    Patient : Abdi Whelan   Age : 27 y.o.  : 1990   MRN : <[id number]>   ______________________________________________________________________    Chief Complaint :   Chief Complaint   Patient presents with   Tammy Osorioman Check-Up       HPI : Abdi Whelan is 27 y.o. male who presented to the clinic today for follow up. Blood in stool:  Tarry stools, blood mixed in with stool. Over 1 year now. Some abdominal pain, cramping. No N/V. Three times a day bowel movements, more formed now since started miralax. Some constipation, no diarrhea. No burning. Some rectal pain after BMs. Varying location, tends to be LUQ at times though. No surgeries, no scopes. No history of IBD, IBS. No [facility] IBD. No dizziness, chest pain, shortness of breath. No frequent NSAIDs. General surgery planning for colonoscopy next month. He has not noticed any correlation with foods, states that he tends to avoid dairy products already. Has not noticed symptoms in correlation with gluten intake. Patient's medications, allergies, past medical, surgical, social and family histories were reviewed and updated as appropriate. Past Medical History :  Past Medical History:   Diagnosis Date    Cardiac murmur     \"innocent as child\"     Past Surgical History:   Procedure Laterality Date    WISDOM TOOTH EXTRACTION      general anesthesia, tolerated well         Review of Systems :    ROS - Per HPI   Review of Systems   Constitutional: Negative for chills, fever and unexpected weight change. Respiratory: Negative for cough, shortness of breath and wheezing. Cardiovascular: Negative for chest pain, palpitations and leg swelling. Gastrointestinal: Positive for abdominal pain, anal bleeding, blood in stool, constipation and rectal pain. Negative for diarrhea, nausea and vomiting. Genitourinary: Negative for dysuria, frequency and hematuria. Skin: Negative for pallor and rash. Neurological: Negative for dizziness, syncope, light-headedness and headaches. Psychiatric/Behavioral: Negative for behavioral problems. The patient is not nervous/anxious. ______________________________________________________________________    Physical Exam :    Wt Readings from Last 3 Encounters:   01/13/22 230 lb (104.3 kg)   01/06/22 228 lb (103.4 kg)   12/13/21 227 lb (103 kg)       BMI Readings from Last 3 Encounters:   01/13/22 34.97 kg/m²   01/06/22 32.71 kg/m²   12/13/21 32.57 kg/m²   ]      Vitals: /76 (Site: Right Upper Arm, Position: Sitting, Cuff Size: Medium Adult)   Pulse 63   Temp 98.1 °F (36.7 °C) (Temporal)   Resp 14   Ht 5' 8\" (1.727 m)   Wt 230 lb (104.3 kg)   SpO2 98%   BMI 34.97 kg/m²     Physical Exam  Constitutional:       General: He is not in acute distress. Appearance: He is well-developed. HENT:      Head: Normocephalic and atraumatic. Eyes:      Conjunctiva/sclera: Conjunctivae normal.      Pupils: Pupils are equal, round, and reactive to light. Neck:      Thyroid: No thyromegaly. Trachea: No tracheal deviation. Cardiovascular:      Rate and Rhythm: Normal rate and regular rhythm. Heart sounds: Normal heart sounds. No murmur heard. Pulmonary:      Effort: Pulmonary effort is normal. No respiratory distress. Breath sounds: Normal breath sounds. No wheezing or rales. Abdominal:      General: Bowel sounds are normal. There is no distension. Palpations: Abdomen is soft. There is no mass. Tenderness: There is no abdominal tenderness. There is no guarding or rebound. Hernia: No hernia is present. Musculoskeletal:         General: Normal range of motion. Cervical back: Normal range of motion and neck supple. Lymphadenopathy:      Cervical: No cervical adenopathy. Skin:     General: Skin is warm and dry. Capillary Refill: Capillary refill takes less than 2 seconds. Findings: No rash. Neurological:      Mental Status: He is alert and oriented to person, place, and time. Cranial Nerves: No cranial nerve deficit. Deep Tendon Reflexes: Reflexes normal.   Psychiatric:         Behavior: Behavior normal.         ______________________________________________________________________    Assessment & Plan :     Diagnosis Orders   1. Functional diarrhea  polyethylene glycol (GLYCOLAX) 17 GM/SCOOP powder   2. Rectal bleeding  polyethylene glycol (GLYCOLAX) 17 GM/SCOOP powder       Abdominal pain/blood in stool: Previously with external hemorrhoids noted on examination, other differential diagnosis including IBD versus IBS versus ulcer. Continue with MiraLAX, follow-up with general surgery and have scope testing performed. Further plan of care to be determined pending results of scopes. Signs and symptoms warranting emergent and urgent evaluation reviewed with patient. Follow up:  Return in about 2 months (around 3/13/2022) for follow up on symptoms.       Kory Portillo MD PGY-3    Discussed with: Dr. Dede Perry

## 2022-02-09 NOTE — PROGRESS NOTES
Patient states has received the last dose of the COVID vaccine and is 2 weeks post last dose. Patient instructed to bring the [place] card or a picture of the card,  day of surgery. Patient verbalized understanding.

## 2022-02-17 NOTE — PROGRESS NOTES
[address] PRE-ADMISSION TESTING GENERAL INSTRUCTIONS- [facility]-phone number:[phone]    GENERAL INSTRUCTIONS  [x] Antibacterial Soap shower Night before and/or AM of Surgery  [x] Follow bowel prep instructions per surgeon. No liquids/jello that are red or purple color. [x] Nothing by mouth after midnight, including gum, candy, mints, or water. [x] You may brush your teeth, gargle, but do NOT swallow water. [x]No smoking, chewing tobacco, illegal drugs, marijuana or alcohol within 24 hours of your surgery. [x] Jewelry, valuables or body piercing's should not be brought to the hospital. All body and/or tongue piercing's must be removed prior to arriving to hospital.  ALL hair pins must be removed. [x] Do not wear makeup, lotions, powders, deodorant. Nail polish as directed by the nurse. [x] Arrange transportation with a responsible adult  to and from the hospital. If you do not have a responsible adult  to transport you, you will need to make arrangements with a medical transportation company (i.e. [employer]. A Uber/taxi/bus is not appropriate unless you are accompanied by a responsible adult ). Arrange for someone to be with you for the remainder of the day and for 24 hours after your procedure due to having had anesthesia. Who will be your  for transportation? Friend  Who will be staying with you for 24 hrs after your procedure? Babar Shankar, grandmother  [x] Bring insurance card and photo ID. PARKING INSTRUCTIONS:   [x] Arrival Time: 7:15 am,    One person may accompany you. You and your  will need to wear a mask. · [x] Parking lot '\"I\"  is located on [facility] (the corner of [facility] and [facility]). To enter, press the button and the gate will lift. A free token will be provided to exit the lot. One car per patient is allowed to park in this lot.  All other cars are to park on [address] [place] [address] either in the parking garage or the handicap lot. Walk up the front walk to the [facility], the door will be locked an employee will greet you and let you in. EDUCATION INSTRUCTIONS:             [x]Pain: Post-op pain is normal and to be expected. You will be asked to rate your pain from 0-10 (a zero is not acceptable-education is needed). Your post-op pain goal is:   [x] Ask your nurse for your pain medication. [x] Other:  Bring with you  Wear loose comfortable clothing    MEDICATION INSTRUCTIONS:  [x]Bring a complete list of your medications, please write the last time you took the medicine, give this list to the nurse.  [] Take the following medications the morning of surgery with 1-2 ounces of water:  None        [x] Stop herbal supplements, ibuprofen (Nsaids)  and vitamins 5 days before your surgery. [x] Follow physician instructions regarding any blood thinners you may be taking. WHAT TO EXPECT:  [x] The day of surgery you will be greeted and checked in by the [facility] [employer] [facility]. Please bring your photo ID and insurance card. A nurse will greet you in accordance to the time you are needed in the pre-op area to prepare you for surgery. Please do not be discouraged if you are not greeted in the order you arrive as there are many variables that are involved in patient preparation. Your patience is greatly appreciated as you wait for your nurse. Please bring in items such as: books, magazines, newspapers, electronics, or any other items  to occupy your time in the waiting area. [x]  Delays may occur with surgery and staff will make a sincere effort to keep you informed of delays. If any delays occur with your procedure, we apologize ahead of time for your inconvenience as we recognize the value of your time.

## 2022-02-22 ENCOUNTER — HOSPITAL ENCOUNTER (OUTPATIENT)
Age: 32
Setting detail: OUTPATIENT SURGERY
Discharge: HOME | End: 2022-02-22
Attending: SURGERY | Admitting: SURGERY
Payer: COMMERCIAL

## 2022-02-22 ENCOUNTER — ANESTHESIA (OUTPATIENT)
Dept: ENDOSCOPY | Age: 32
End: 2022-02-22
Payer: COMMERCIAL

## 2022-02-22 ENCOUNTER — ANESTHESIA EVENT (OUTPATIENT)
Dept: ENDOSCOPY | Age: 32
End: 2022-02-22
Payer: COMMERCIAL

## 2022-02-22 VITALS
RESPIRATION RATE: 16 BRPM | OXYGEN SATURATION: 99 % | DIASTOLIC BLOOD PRESSURE: 47 MMHG | SYSTOLIC BLOOD PRESSURE: 105 MMHG

## 2022-02-22 VITALS
RESPIRATION RATE: 18 BRPM | HEART RATE: 59 BPM | SYSTOLIC BLOOD PRESSURE: 106 MMHG | TEMPERATURE: 97.9 F | HEIGHT: 68 IN | BODY MASS INDEX: 34.1 KG/M2 | DIASTOLIC BLOOD PRESSURE: 57 MMHG | OXYGEN SATURATION: 96 % | WEIGHT: 225 LBS

## 2022-02-22 PROCEDURE — 3609012400 HC EGD TRANSORAL BIOPSY SINGLE/MULTIPLE: Performed by: SURGERY

## 2022-02-22 PROCEDURE — 2709999900 HC NON-CHARGEABLE SUPPLY: Performed by: SURGERY

## 2022-02-22 PROCEDURE — 88305 TISSUE EXAM BY PATHOLOGIST: CPT

## 2022-02-22 PROCEDURE — 7100000011 HC PHASE II RECOVERY - ADDTL 15 MIN: Performed by: SURGERY

## 2022-02-22 PROCEDURE — 3700000001 HC ADD 15 MINUTES (ANESTHESIA): Performed by: SURGERY

## 2022-02-22 PROCEDURE — 3700000000 HC ANESTHESIA ATTENDED CARE: Performed by: SURGERY

## 2022-02-22 PROCEDURE — 45385 COLONOSCOPY W/LESION REMOVAL: CPT | Performed by: SURGERY

## 2022-02-22 PROCEDURE — 7100000010 HC PHASE II RECOVERY - FIRST 15 MIN: Performed by: SURGERY

## 2022-02-22 PROCEDURE — 45380 COLONOSCOPY AND BIOPSY: CPT | Performed by: SURGERY

## 2022-02-22 PROCEDURE — 6360000002 HC RX W HCPCS

## 2022-02-22 PROCEDURE — 2580000003 HC RX 258: Performed by: SURGERY

## 2022-02-22 PROCEDURE — 3609010400 HC COLONOSCOPY POLYPECTOMY HOT BIOPSY: Performed by: SURGERY

## 2022-02-22 PROCEDURE — 43239 EGD BIOPSY SINGLE/MULTIPLE: CPT | Performed by: SURGERY

## 2022-02-22 PROCEDURE — 3609010300 HC COLONOSCOPY W/BIOPSY SINGLE/MULTIPLE: Performed by: SURGERY

## 2022-02-22 RX ORDER — SODIUM CHLORIDE 9 MG/ML
25 INJECTION, SOLUTION INTRAVENOUS PRN
Status: DISCONTINUED | OUTPATIENT
Start: 2022-02-22 | End: 2022-02-22 | Stop reason: HOSPADM

## 2022-02-22 RX ORDER — SODIUM CHLORIDE 0.9 % (FLUSH) 0.9 %
10 SYRINGE (ML) INJECTION PRN
Status: DISCONTINUED | OUTPATIENT
Start: 2022-02-22 | End: 2022-02-22 | Stop reason: HOSPADM

## 2022-02-22 RX ORDER — SODIUM CHLORIDE 0.9 % (FLUSH) 0.9 %
10 SYRINGE (ML) INJECTION EVERY 12 HOURS SCHEDULED
Status: DISCONTINUED | OUTPATIENT
Start: 2022-02-22 | End: 2022-02-22 | Stop reason: HOSPADM

## 2022-02-22 RX ORDER — SODIUM CHLORIDE, SODIUM LACTATE, POTASSIUM CHLORIDE, CALCIUM CHLORIDE 600; 310; 30; 20 MG/100ML; MG/100ML; MG/100ML; MG/100ML
INJECTION, SOLUTION INTRAVENOUS CONTINUOUS
Status: DISCONTINUED | OUTPATIENT
Start: 2022-02-22 | End: 2022-02-22 | Stop reason: HOSPADM

## 2022-02-22 RX ORDER — FENTANYL CITRATE 50 UG/ML
INJECTION, SOLUTION INTRAMUSCULAR; INTRAVENOUS PRN
Status: DISCONTINUED | OUTPATIENT
Start: 2022-02-22 | End: 2022-02-22 | Stop reason: SDUPTHER

## 2022-02-22 RX ORDER — PROPOFOL 10 MG/ML
INJECTION, EMULSION INTRAVENOUS PRN
Status: DISCONTINUED | OUTPATIENT
Start: 2022-02-22 | End: 2022-02-22 | Stop reason: SDUPTHER

## 2022-02-22 RX ADMIN — PROPOFOL 450 MG: 10 INJECTION, EMULSION INTRAVENOUS at 09:39

## 2022-02-22 RX ADMIN — SODIUM CHLORIDE, POTASSIUM CHLORIDE, SODIUM LACTATE AND CALCIUM CHLORIDE: 600; 310; 30; 20 INJECTION, SOLUTION INTRAVENOUS at 08:40

## 2022-02-22 RX ADMIN — FENTANYL CITRATE 50 MCG: 50 INJECTION, SOLUTION INTRAMUSCULAR; INTRAVENOUS at 09:46

## 2022-02-22 ASSESSMENT — PAIN SCALES - GENERAL
PAINLEVEL_OUTOF10: 0
PAINLEVEL_OUTOF10: 0

## 2022-02-22 ASSESSMENT — PAIN - FUNCTIONAL ASSESSMENT: PAIN_FUNCTIONAL_ASSESSMENT: 0-10

## 2022-02-22 ASSESSMENT — PAIN DESCRIPTION - DESCRIPTORS: DESCRIPTORS: ACHING;DISCOMFORT

## 2022-02-22 ASSESSMENT — LIFESTYLE VARIABLES: SMOKING_STATUS: 1

## 2022-02-22 NOTE — H&P
[facility] Surgery   History and Physical    Patient's Name/Date of Birth: Tiffanie Hair / 1990 (34 y.o.)      PCP: Gifty Fierro MD      CC:  Blood per rectum    HPI:  34 y.o. male  Hx diarrhea, and abdominal pain, intermittent blood per rectum. Here for colonoscopy. No hx colon cancer polyps. Past Medical History:   Diagnosis Date    Cardiac murmur     \"innocent as child\"       Past Surgical History:   Procedure Laterality Date    WISDOM TOOTH EXTRACTION      general anesthesia, tolerated well       Family History   Problem Relation Age of Onset    Other Father        Social History     Socioeconomic History    Marital status: Single     Spouse name: Not on file    Number of children: Not on file    Years of education: Not on file    Highest education level: Not on file   Occupational History    Not on file   Tobacco Use    Smoking status: Current Every Day Smoker     Packs/day: 0.25     Types: Cigars    Smokeless tobacco: Never Used   Vaping Use    Vaping Use: Never used   Substance and Sexual Activity    Alcohol use: Yes     Alcohol/week: 7.0 standard drinks     Types: 7 Shots of liquor per week    Drug use: Yes     Types: Marijuana Dolph Ángel)     Comment: occassional use  smokes a joint every 3 weeks    Sexual activity: Yes     Partners: Female     Comment: one female partner   Other Topics Concern    Not on file   Social History Narrative    Not currently working, in half-way house, 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 Social Determinants of Health     Financial Resource Strain: Low Risk     Difficulty of Paying Living Expenses: Not hard at all   Food Insecurity: No Food Insecurity    Worried About Running Out of Food in the Last Year: Never true    Janina of Food in the Last Year: Never true   Transportation Needs:     Lack of Transportation (Medical): Not on file    Lack of Transportation (Non-Medical):  Not on file   Physical Activity:     Days of Exercise per Week: Not on file    Minutes of Exercise per Session: Not on file   Stress:     Feeling of Stress : Not on file   Social Connections:     Frequency of Communication with Friends and Family: Not on file    Frequency of Social Gatherings with Friends and Family: Not on file    Attends Bahai Services: Not on file    Active Member of Clubs or Organizations: Not on file    Attends Club or Organization Meetings: Not on file    Marital Status: Not on file   Intimate Partner Violence:     Fear of Current or Ex-Partner: Not on file    Emotionally Abused: Not on file    Physically Abused: Not on file    Sexually Abused: Not on file   Housing Stability:     Unable to Pay for Housing in the Last Year: Not on file    Number of [place] in the Last Year: Not on file    Unstable Housing in the Last Year: Not on file       Current Facility-Administered Medications   Medication Dose Route Frequency Provider Last Rate Last Admin    0.9 % sodium chloride infusion  25 mL IntraVENous PRN Ирина Fiore MD        lactated ringers infusion   IntraVENous Continuous Ирина Fiore MD        sodium chloride flush 0.9 % injection 10 mL  10 mL IntraVENous 2 times per day Ирина Fiore MD        sodium chloride flush 0.9 % injection 10 mL  10 mL IntraVENous PRN Ирина Fiore MD           No Known Allergies    REVIEW OF SYSTEMS:    Constitutional: negative   Eyes: negative  Ears, nose, mouth, throat, and face: negative  Respiratory: negative  Cardiovascular: negative  Gastrointestinal: negative  Genitourinary:negative  Integument/breast: negative  Hematologic/lymphatic: negative  Musculoskeletal:negative  Neurological: negative  Allergic/Immunologic: negative    Physical Exam:    @Ht 5' 8\" (1.727 m)   Wt 225 lb (102.1 kg)   BMI 34.21 kg/m² @    GENERAL EXAM: On exam- pt appears stated age. No acute distress. NEURO:  Alert and oriented x 3.  No obvious neuro deficits   HEENT: head- atraumatic-normocephalic. No discharge from ears, nose or throat. NECK: Supple. No jugular venous distention. CVS:RR  LUNGS: Bilateral chest movements without the use of accessory muscles. Respirations easy, nonlabored  ABDOMEN: Abdomen soft, nondistended, nontender, No palpable hernias noted  RECTAL: exam deferred.   EXTREMITIES: No edema, NVI and symmetrical       IMPRESSION/PLAN: This is a 34 y.o. male who has blood per rectum     Diagnostic EGD and colonoscopy     Electronically Signed by Adan Young MD [facility]   8:17 AM

## 2022-02-22 NOTE — OP NOTE
EGD/Colonoscopy Op Note    DATE OF PROCEDURE: 2/22/2022     SURGEON: Erika Persaud MD    PREOPERATIVE DIAGNOSIS: diarrhea abdominal pain,     POSTOPERATIVE DIAGNOSIS: Same, mild sigmoid colitis, rectosigmoid polyp. Duodenal bx to rule out celiac, random colon and terminal ileum bx to rule out IBD. OPERATION: Procedure(s):  EGD BIOPSY  COLONOSCOPY WITH BIOPSY  COLONOSCOPY POLYPECTOMY HOT BIOPSY    ANESTHESIA: Local monitored anesthesia. ESTIMATED BLOOD LOSS: minimal    COMPLICATIONS: None. SPECIMENS:    ID Type Source Tests Collected by Time Destination   A : Duodenum R/O Celiac Gastric Gastric SURGICAL PATHOLOGY Erika Persaud MD 2/22/2022 0477    B : Terminal Ileum Tissue Colon SURGICAL PATHOLOGY Erika Persaud MD 2/22/2022 3157    C : Right Colon Tissue Colon SURGICAL PATHOLOGY Erika Persaud MD 2/22/2022 2658    D : Transverse Colon Tissue Colon SURGICAL PATHOLOGY Erika Persaud MD 2/22/2022 1648    E : Left Colon Tissue Colon SURGICAL PATHOLOGY Erika Persaud MD 2/22/2022 6418    F : Sigmoid Colon Tissue Colon SURGICAL PATHOLOGY Erika Persaud MD 2/22/2022 0957    G : Pedunculated Sigmoid Polyp  Tissue Colon SURGICAL PATHOLOGY Erika Persaud MD 2/22/2022 1002    H : Rectum Tissue Colon SURGICAL PATHOLOGY Erika Persaud MD 2/22/2022 1003        HISTORY: The patient is a 34y.o. year old male with history of above preop diagnosis. I recommended esophagogastroduodenoscopy and colonoscopy with possible biopsy/polypectomy and I explained the risk, benefits, expected outcome, and alternatives to the procedure. Risks included but are not limited to bleeding, infection, respiratory distress, hypotension, and perforation. Patient understands and is in agreement. PROCEDURE: The patient was given IV conscious sedation per anesthesia. The patient was given supplemental oxygen by nasal cannula.   The gastroscope was inserted orally and advanced under direct vision through the esophagus, through the stomach, through the pylorus, and into the duodenum. Findings:  Duodenum:     Descending: normal bx taken x 2 cold forceps     Bulb: normal    Stomach:    Antrum: normal    Body: normal    Fundus: normal    Esophagus: normal    Larynx: not examined    The scope was removed and the patient tolerated the procedure well. The colonoscope was inserted per rectum and advanced under direct vision to the cecum without difficulty, identified by ileocecal valve. The prep was good so exam was adequate. FINDINGS:    DAVIAN: normal    Terminal Ileum: prominent pyres patches, random bx taken cold forceps. Cecum/Ascending colon: normal random bx taken cold forceps    Transverse colon: normal random bx taken cold forceps     Descending/Sigmoid colon: 1 cm pedunculated polyp removed hot snare. Random bx taken cold forceps mild colitis,  Rectum/Anus: examined in normal and retroflexed positions and was normal    The colon was decompressed and the scope was removed. The withdraw time was approximately 15 minutes. The patient tolerated the procedure well. ASSESSMENT/PLAN:   1. Fu in office for pathology review.       Annmarie George MD  02/22/22  10:06 AM

## 2022-02-22 NOTE — PROGRESS NOTES
1038 Patient ambulated to the bathroom with assist.  1045 Patient returned to room. Sitting up drinking and eating crackers.

## 2022-02-22 NOTE — ANESTHESIA POSTPROCEDURE EVALUATION
Department of Anesthesiology  Postprocedure Note    Patient: Faisal Mayer  MRN: [id number]  YOB: 1990  Date of evaluation: 2/22/2022  Time:  10:16 AM     Procedure Summary     Date: 02/22/22 Room / Location: [address] [place], [place] [address]bet / [facility]    Anesthesia Start: 7244 Anesthesia Stop: 6776    Procedures:       EGD BIOPSY (N/A )      COLONOSCOPY WITH BIOPSY (N/A )      COLONOSCOPY POLYPECTOMY HOT BIOPSY Diagnosis: (RECTAL BLEEDING AND ABDOMINAL PAIN)    Surgeons: Rosa Souza MD Responsible Provider: Va Esteban MD    Anesthesia Type: MAC ASA Status: 2          Anesthesia Type: MAC    Liliana Phase I: Liliana Score: 10    Liliana Phase II:      Last vitals: Reviewed and per EMR flowsheets.        Anesthesia Post Evaluation    Patient location during evaluation: PACU  Patient participation: complete - patient participated  Level of consciousness: awake and alert  Airway patency: patent  Nausea & Vomiting: no nausea and no vomiting  Complications: no  Cardiovascular status: hemodynamically stable and blood pressure returned to baseline  Respiratory status: acceptable  Hydration status: euvolemic

## 2022-02-22 NOTE — ANESTHESIA PRE PROCEDURE
Department of Anesthesiology  Preprocedure Note       Name:  Mayank Wilcox   Age:  34 y.o.  :  1990                                          MRN:  [id number]         Date:  2022      Surgeon: Natalia Rocha):  Pablo Ballard MD    Procedure: Procedure(s):  EGD DIAGNOSTIC ONLY  COLONOSCOPY DIAGNOSTIC    Medications prior to admission:   Prior to Admission medications    Medication Sig Start Date End Date Taking?  Authorizing Provider   bisacodyl (DULCOLAX) 5 MG EC tablet Take 4 tablets orally twice the day before colonoscopy as directed 22  Yes Orly Miller MD   polyethylene glycol [facility]) 17 GM/SCOOP powder Take 17 g by mouth daily 22  Eduardo Gordon MD   Multiple Vitamins-Minerals (VITAMIN-MINERAL SUPPLEMENT PO) Take 1 tablet by mouth daily    Historical Provider, MD   magnesium citrate solution Take 300 mLs by mouth 3 times daily for 3 doses Take one 10 ounce bottle three times the day before colonoscopy as directed 22  Orly Miller MD       Current medications:    Current Facility-Administered Medications   Medication Dose Route Frequency Provider Last Rate Last Admin    0.9 % sodium chloride infusion  25 mL IntraVENous PRN Orly Miller MD        lactated ringers infusion   IntraVENous Continuous Orly Miller MD        sodium chloride flush 0.9 % injection 10 mL  10 mL IntraVENous 2 times per day Orly Miller MD        sodium chloride flush 0.9 % injection 10 mL  10 mL IntraVENous PRN Orly Miller MD           Allergies:  No Known Allergies    Problem List:    Patient Active Problem List   Diagnosis Code    Tobacco use Z72.0    Rectal bleeding K62.5    Functional diarrhea K59.1       Past Medical History:        Diagnosis Date    Cardiac murmur     \"innocent as child\"       Past Surgical History:        Procedure Laterality Date    WISDOM TOOTH EXTRACTION      general anesthesia, tolerated well       Social History:    Social History     Tobacco HEPCAB    COVID-19 Screening (If Applicable): No results found for: COVID19        Anesthesia Evaluation  Patient summary reviewed no history of anesthetic complications:   Airway:         Dental:          Pulmonary:                              Cardiovascular:                      Neuro/Psych:               GI/Hepatic/Renal:   (+) bowel prep,           Endo/Other:                     Abdominal:             Vascular: Other Findings:             Anesthesia Plan      MAC     ASA 1       Induction: intravenous. Anesthetic plan and risks discussed with patient. Use of blood products discussed with patient whom consented to blood products. Plan discussed with CRNA and attending.                   Deborah Leonardo RN   2/22/2022

## 2022-02-22 NOTE — ANESTHESIA PRE PROCEDURE
Department of Anesthesiology  Preprocedure Note       Name:  Nuzhat Chen   Age:  34 y.o.  :  1990                                          MRN:  [id number]         Date:  2022      Surgeon: Lulu Marino):  Gwen Tapia MD    Procedure: Procedure(s):  EGD DIAGNOSTIC ONLY  COLONOSCOPY DIAGNOSTIC    Medications prior to admission:   Prior to Admission medications    Medication Sig Start Date End Date Taking?  Authorizing Provider   polyethylene glycol (GLYCOLAX) 17 GM/SCOOP powder Take 17 g by mouth daily 22 Yes Lexy Curran MD   bisacodyl (DULCOLAX) 5 MG EC tablet Take 4 tablets orally twice the day before colonoscopy as directed 22  Yes Ирина Fiore MD   Multiple Vitamins-Minerals (VITAMIN-MINERAL SUPPLEMENT PO) Take 1 tablet by mouth daily    Historical Provider, MD   magnesium citrate solution Take 300 mLs by mouth 3 times daily for 3 doses Take one 10 ounce bottle three times the day before colonoscopy as directed 22  Ирина Fiore MD       Current medications:    Current Facility-Administered Medications   Medication Dose Route Frequency Provider Last Rate Last Admin    0.9 % sodium chloride infusion  25 mL IntraVENous PRN Ирина Fiore MD        lactated ringers infusion   IntraVENous Continuous Ирина Fiore MD        sodium chloride flush 0.9 % injection 10 mL  10 mL IntraVENous 2 times per day Ирина Fiore MD        sodium chloride flush 0.9 % injection 10 mL  10 mL IntraVENous PRN Ирина Fiore MD           Allergies:  No Known Allergies    Problem List:    Patient Active Problem List   Diagnosis Code    Tobacco use Z72.0    Rectal bleeding K62.5    Functional diarrhea K59.1       Past Medical History:        Diagnosis Date    Cardiac murmur     \"innocent as child\"       Past Surgical History:        Procedure Laterality Date    WISDOM TOOTH EXTRACTION      general anesthesia, tolerated well       Social History:    Social History Tobacco Use    Smoking status: Current Every Day Smoker     Packs/day: 0.25     Types: Cigars    Smokeless tobacco: Never Used   Substance Use Topics    Alcohol use: Yes     Alcohol/week: 7.0 standard drinks     Types: 7 Shots of liquor per week                                Ready to quit: No  Counseling given: Not Answered      Vital Signs (Current):   Vitals:    02/17/22 0854 02/22/22 0819   BP:  122/65   Pulse:  64   Resp:  20   Temp:  36.9 °C (98.5 °F)   TempSrc:  Temporal   SpO2:  93%   Weight: 225 lb (102.1 kg) 225 lb (102.1 kg)   Height: 5' 8\" (1.727 m) 5' 8\" (1.727 m)                                              BP Readings from Last 3 Encounters:   02/22/22 122/65   01/13/22 117/76   01/06/22 (!) 144/84       NPO Status: Time of last liquid consumption: 0630                        Time of last solid consumption: 2200                        Date of last liquid consumption: 02/22/22                        Date of last solid food consumption: 02/21/22    BMI:   Wt Readings from Last 3 Encounters:   02/22/22 225 lb (102.1 kg)   01/13/22 230 lb (104.3 kg)   01/06/22 228 lb (103.4 kg)     Body mass index is 34.21 kg/m². CBC:   Lab Results   Component Value Date    WBC 3.5 12/13/2021    RBC 5.36 12/13/2021    HGB 14.3 12/13/2021    HCT 44.5 12/13/2021    MCV 83.0 12/13/2021    RDW 13.3 12/13/2021     12/13/2021       CMP:   Lab Results   Component Value Date     09/04/2015    K 4.0 09/04/2015     09/04/2015    CO2 30 09/04/2015    BUN 11 09/04/2015    CREATININE 1.3 09/04/2015    GFRAA >60 09/04/2015    LABGLOM >60 09/04/2015    GLUCOSE 103 09/04/2015    CALCIUM 9.7 09/04/2015       POC Tests: No results for input(s): POCGLU, POCNA, POCK, POCCL, POCBUN, POCHEMO, POCHCT in the last 72 hours.     Coags: No results found for: PROTIME, INR, APTT    HCG (If Applicable): No results found for: PREGTESTUR, PREGSERUM, HCG, HCGQUANT     ABGs: No results found for: PHART, PO2ART, GAZ1SMW, NSJ0KXO, BEART, N5WZMRQK     Type & Screen (If Applicable):  No results found for: LABABO, LABRH    Drug/Infectious Status (If Applicable):  No results found for: HIV, HEPCAB    COVID-19 Screening (If Applicable): No results found for: COVID19        Anesthesia Evaluation  Patient summary reviewed and Nursing notes reviewed no history of anesthetic complications (no previopus surgeries. Patient has moderate motion sickness. ):   Airway: Mallampati: III  TM distance: >3 FB   Neck ROM: full  Mouth opening: > = 3 FB Dental:      Comment: Denies loose or chipped teeth    Pulmonary: breath sounds clear to auscultation  (+) current smoker (pack every 4 days for the past 3 months. Smoked 1 cigarette at 0700 this morning)          Patient smoked on day of surgery. Cardiovascular:  Exercise tolerance: good (>4 METS),   (+) murmur,          Beta Blocker:  Not on Beta Blocker         Neuro/Psych:   Negative Neuro/Psych ROS              GI/Hepatic/Renal:   (+) bowel prep,          ROS comment: Rectal bleeding, functional diarrhea. Endo/Other: Negative Endo/Other ROS                    Abdominal:             Vascular: negative vascular ROS. Other Findings:           Anesthesia Plan      MAC     ASA 2       Induction: intravenous. MIPS: Prophylactic antiemetics administered. Anesthetic plan and risks discussed with patient. Use of blood products discussed with patient whom consented to blood products. Plan discussed with CRNA and attending.                 Anastasiia Dimas, RN   2/22/2022

## 2022-02-25 ENCOUNTER — TELEPHONE (OUTPATIENT)
Dept: SURGERY | Age: 32
End: 2022-02-25

## 2022-02-25 NOTE — TELEPHONE ENCOUNTER
Pt returned call. MA read pt Dr. Zulay Coyle note, answered all pt questions, and scheduled pt for 2 week follow up with Dr. Wyatt Bourgeois. Pt verbalized understanding and accepted appt.     Electronically signed by Desirae Rao MA on 2/25/2022 at 3:03 PM

## 2022-02-25 NOTE — TELEPHONE ENCOUNTER
I saw the patient in the office on 1/6/2022 for complaints of intermittent lower abdominal pain, chronic diarrhea, rectal bleeding, heartburn and indigestion. He was recommended EGD and colonoscopy for further evaluation. He was scheduled for this on 2/22/2022. Due to family emergency at the last minute, my partner, Dr. Odilia Guy, performed the patient's colonoscopy. He found mild sigmoid colitis & rectosigmoid polyp. He performed random biopsies in the terminal ileum, cecum/a descending colon, and transverse colon. He removed the polyp with snare polypectomy in the sigmoid colon. EGD was reportedly normal although biopsies are taken of the descending colon to rule out celiac disease. I reviewed the pathology report today. Duodenal biopsies were normal with no evidence of celiac disease. Random biopsies of the terminal ileum, right colon, transverse colon, and left colon were unremarkable. Sigmoid colon biopsy showed \"focal active colitis\" however in the comments it was felt that this could be due to variety different things including irritation from the prep. Although inflammatory bowel disease cannot be totally excluded. The sigmoid colon polyp was found to be tubular adenoma. Rectal biopsies were also performed which were unremarkable. I tried calling the patient today to discuss the above results with him. However he did not answer. I left a message on his voicemail to call me back to my office to discuss the above. I will have my medical assistant try to contact patient to schedule him to follow-up with me in the office within the next 1 to 2 weeks to review these results and see how his symptoms are doing.     Electronically signed by Rebeca Farfan MD on 2/25/22 at 2:50 PM EST

## 2022-03-04 ENCOUNTER — HOSPITAL ENCOUNTER (OUTPATIENT)
Dept: PSYCHIATRY | Age: 32
Setting detail: THERAPIES SERIES
Discharge: HOME | End: 2022-03-04
Payer: COMMERCIAL

## 2022-03-04 PROCEDURE — 90791 PSYCH DIAGNOSTIC EVALUATION: CPT

## 2022-03-04 ASSESSMENT — LIFESTYLE VARIABLES: HISTORY_ALCOHOL_USE: YES

## 2022-03-04 ASSESSMENT — ANXIETY QUESTIONNAIRES
6. BECOMING EASILY ANNOYED OR IRRITABLE: 0
7. FEELING AFRAID AS IF SOMETHING AWFUL MIGHT HAPPEN: 0
4. TROUBLE RELAXING: 0
3. WORRYING TOO MUCH ABOUT DIFFERENT THINGS: 0
IF YOU CHECKED OFF ANY PROBLEMS ON THIS QUESTIONNAIRE, HOW DIFFICULT HAVE THESE PROBLEMS MADE IT FOR YOU TO DO YOUR WORK, TAKE CARE OF THINGS AT HOME, OR GET ALONG WITH OTHER PEOPLE: NOT DIFFICULT AT ALL
GAD7 TOTAL SCORE: 1
1. FEELING NERVOUS, ANXIOUS, OR ON EDGE: 1
5. BEING SO RESTLESS THAT IT IS HARD TO SIT STILL: 0
2. NOT BEING ABLE TO STOP OR CONTROL WORRYING: 0

## 2022-03-04 ASSESSMENT — PATIENT HEALTH QUESTIONNAIRE - PHQ9: SUM OF ALL RESPONSES TO PHQ QUESTIONS 1-9: 0

## 2022-03-05 NOTE — CARE COORDINATION
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 He use was limited once every two to three weeks because of his probation. There is a history of daily  Opiate use which stopped in 2018. Patient is currently cross using alcohol. Imbibing two shots of tequlia per day usually after work. He is in the pre contemplation stage and believes he doesn't need treatment. He is recommended for IOP treatment. Criteria symptoms   A problematic pattern of substance use leading to clinically significant impairment or distress, as manifested by at least two of the following, occurring within a 12-month period. [] Taken in larger amounts or over longer time than intended. [x] Persistent desire or unsuccessful efforts to cut down/control use. [] Great deal of time spent obtaining , using, and recovering from effects. [x] Craving or strong desire to use. [] Recurrent use resulting in failure to fulfill major role obligations at work; school, or home. [x] Continued use despite persistent or recurrent social/interpersonal problems caused or exacerbated by effects. [] Giving up important social, occupational or recreational activities because of use. [] Recurrent use in situations in which it is physically hazardous. [x] Continued use despite knowledge of persistent or recurrent physical or psychological problem likely caused/exacerbated by use.      [] Tolerance as defined by either:  · Need for increased amounts to achieve intoxication or desired effects. · Diminished effect with continued use of the same amount.     [] Withdrawal as manifested by either:  · The characteristic withdrawl symptoms  · Using to relieve or avoid withdrawal symptoms

## 2022-03-05 NOTE — PLAN OF CARE
[facility]- Level of Care Placement      [x]Admissions  []Continued Stay []Discharge/Transfer / Complication in [address] REIY:6/1/[birth year]66    Client Sticker      Level of Care Level 1      Outpatient Services Level 2.1   Intensive Outpatient Services(IOP) Level 2.5   Partial Hospitalization Services Level 3.1 CLINICALLY Managed Low-Intensity Residential Services Level 3.3  CLINICALLY Managed Population- Specific High- Intensity Residential Services Level 3.5  CLINICALLY Managed High Intensive Residential Services Level 3.7  MEDICALLY Monitored Intensive Inpatient Services Level 4  MEDICALLY Managed Intensive Inpatient Services   Dimension 1  Acute Intoxication and/or Withdrawal Potential [x] Not experiencing significant withdrawal    [] Minimal  risk of severe  withdrawal [] Minimal  risk of severe  withdrawal    [] Manageable  at Level 2-WM [] Moderate  risk of severe withdrawal    [] Manageable at Level 2-WM [] No withdrawal risk or minimal or stable withdrawal     [] Concurrently receiving Level -WM or Level 2-WM services [] Minimal risk of severe withdrawal    [] If withdrawal is present, manageable at Level 3. 2-WM  [] Minimal risk of severe withdrawal    [] If withdrawal is present manageable at Level 3. 2-WM [] High risk of withdrawal, but manageable at Level  3.7-WM and does not require  full resources of a licensed hospital [] At high risk of withdrawal and requires Level 4-WM and full resources of licensed hospital    COMMENTS:           Dimension 2   Biomedical Conditions and Complications  (BMC/C) [x] None or very stable    [] Receiving concurrent medical monitoring  [] None or not a distraction from treatment    [] Problems are manageable at Level 2.1 [] None or not sufficient to distract from treatment    [] Problems are manageable at  Level 2.5 [] None or stable    [] Receiving concurrent medical monitoring  [] None or stable    [] Receiving concurrent medical monitoring  [] None or stable    [] Receiving concurrent medical monitoring [] Requires 24-hour medical monitoring  but not intensive treatment [] Requires 24-hour medical & nursing care and the full  resources of a licensed hospital   COMMENTS:           Dimension 3  Emotional,  Behavioral,  or Cognitive Conditions and Complications   (EBC/C) [x] None or very stable    [] Receiving concurrent mental health monitoring [] Mild severity  with potential to distract from recovery; needs monitoring [] Mild to moderate severity, with potential to distract from recovery, needs stabilization [] None or minimal; not distracting to recovery    [] If  stable, a    co-occurring capable program is appropriate    [] If not stable, a co-occurring enhanced program is required [] Mild to moderate severity; needs structure to focus on recovery. Treatment should be designed to address significant cognitive deficits     [] If  stable, a  co-occurring capable program is appropriate    [] If not stable, a co-occurring enhanced program is required [] Demonstrates repeated inability to control impulses or unstable & dangerous signs/ symptoms require stabilization. Other functional deficits require stabilization and 24-hr.  setting to prepare for community integration  & continuing care    [] Co-occurring enhanced setting is required for those with severe & chronic mental illness [] Moderate severity;  needs 24-hour   structured setting    [] If client has co-occurring mental disorder, requires concurrent mental health services in a medically monitored setting  [] Severe and unstable problems;  requires 24-hour psychiatric care  with concomitant addiction treatment   COMMENTS:             Electronically signed by TR Masterson, BRADENW on 3/5/2022 at 1:28 PM             [address] Placement      [x]Admissions  []Continued Stay []Discharge/Transfer / Complication in [place] Date:3/5/2022    Client Sticker      Level of Care Level 1  Outpatient   Services Level 2.1  Intensive  Outpatient  Services Level 2.5  Partial Hospitalization Services Level 3.1  CLINICALLY Managed Low-intensity Residential Services Level 3.3  CLINICALLY Managed Population- Specific High- Intensity Residential Services Level 3.5  CLINICALLY Managed High-Intensive Residential Services Level 3.7  MEDICALLY Monitored Intensive Inpatient Services Level 4  MEDICALLY Managed Intensive Inpatient Services   Dimension 4  Readiness  To  Change [] Ready for recovery but needs motivating and monitoring strategies to strengthen readiness    []Needs ongoing monitoring and disease management    [] High severity in this dimension but not in other dimensions. Needs Level 1 motivational enhancement strategies   [x] Has variable engagement in treatment, ambivalence, or lack of awareness of substance use or mental health problems and requires structured program several times/wk. to promote progress through stages of change [] Has poor engagement in treatment, significant ambivalence, or lack of awareness of substance use or mental health problems, requires near daily structured program or intensive engagement to promote progress through stages of change [] Open to recovery, but needs structured environment to maintain therapeutic gains [] Has little awareness & needs interventions at Level 3.3 to engage & stay in treatment.     [] If there is high severity in this dimension, but not in any other dimension, motivational enhancement strategies should be provided in Level 1 [] Has marked difficulty with, or opposition to treatment with dangerous consequences    [] If there is high severity in this dimension, but not in any other dimension, motivational enhancement strategies should be provided in Level 1 [] Low interest in treatment and impulse control is poor despite negative consequences;  needs motivating strategies only safely cope [x] Recovery environment is not supportive  but with structure and support, the client can cope [] Recovery environment is not supportive, but with structure and support and relief from the home environment, client can cope [] Environment    is dangerous, but recovery is achievable if Level 3.1 24-hour structure is available  [] Environment is dangerous and  client needs 24-hour structure to learn to cope [] Environment is dangerous, and the client lacks skills to cope outside of a  highly structured 24-hour setting   [] Environment is dangerous, and the client lacks skills to cope outside of a  highly structured 24-hour setting [] Problems in this dimension do not qualify the client for Level 4 services    [] If the client's only severity is in Dimension 4,5, and/or 6 without high severity in Dimensions 1,2, and/or 3, then client is not qualified for Level 4   COMMENTS:               Electronically signed by TR Teague, LSW on 3/5/2022 at 1:28 PM

## 2022-03-05 NOTE — CARE COORDINATION
Biopsychosocial Assessment Note    Name: Faisal Mayer  Date: 3/5/2022  Start Time: 10:00  End Time:11:00    Social work met with patient to complete the biopsychosocial assessment and CSSR-S. Mental Status Exam: Patient presented well groomed and casually dressed, he had adequate eye contact,and was cooperate and pleasant during the assessment. He was oriented times four, his affect and mood were appropriate. His speech was normal rate and tone. His insight and judgement was limited regarding substance use. Presenting Problem: The patient was set here through his federal probation. He noted he is homeless and lives house to house. He was [facility] the [facility] on [facility]. His past criminal activity included a DUI in 2016 and federal drug conspiracy in 2018 serving 3.5 years. He is currently on probation. Patient Report and Notes: Patient's DOC is cannabis which he last used in January 2022. He use was limited once every two to three weeks because of his probation. There is a history of daily  Opiate use which stopped in 2018. Patient is currently cross using alcohol. Imbibing two shots of tequila per day usually after work. He is in the pre contemplation stage and believes he doesn't need treatment. In addition the patient is homeless living house to house. He is gainfully employed in [place] [employer] for which he uses the bus to get to and from his employer. He is recommended of IOP treatment. Gender  [x] Male [] Female [] Transgender  [] Other    Sexual Orientation    [x] Heterosexual [] Homosexual [] Bisexual [] Other    Suicidal Ideation  [] Reports   [x] Denies    Homicidal Ideation  [] Reports   [x] Denies      Hallucinations/Delusions   [] Reports   [x] Denies     Substance Use/Alcohol Use/Addiction  [x] Reports    [] Denies     Trauma History  [] Reports    [x] Denies     Plan of Care: Patient is recommended for the IOP treatment 18-24 sessions.     Patient Goal: To follow rules of probation     Patient PHQ 9 Score: 0  Interpretation of Total Score Depression Severity: 1-4 = Minimal depression, 5-9 = Mild depression, 10-14 = Moderate depression, 15-19 = Moderately severe depression, 20-27 = Severe depression    Preliminary Diagnosis and Criteria: F11.20 in full sustained remission,(opiates) F10.10 (alcohol) and F12.10(cannabis)      If session conducted via telehealth:    This session was conducted via: [] Video [] Telephone  Patient Location:  [] Treatment Center [] Home [] Other  Provider Location: [] Treatment Center [] Home [] Other    Signed: TR Adorno, [facility]               3/5/2022

## 2022-03-08 NOTE — FLOWSHEET NOTE
Yesterday, SW left voice mail with [employer] related to patient if coming into the program has to have a alcohol restriction. And that the client is living house to house and has no transportation.     Electronically signed by TR Cruz, BRADENW on 3/8/2022 at 3:04 PM

## 2022-03-10 ENCOUNTER — OFFICE VISIT (OUTPATIENT)
Dept: SURGERY | Age: 32
End: 2022-03-10
Payer: COMMERCIAL

## 2022-03-10 VITALS
TEMPERATURE: 97.9 F | RESPIRATION RATE: 16 BRPM | BODY MASS INDEX: 34.25 KG/M2 | SYSTOLIC BLOOD PRESSURE: 135 MMHG | OXYGEN SATURATION: 97 % | WEIGHT: 226 LBS | HEIGHT: 68 IN | HEART RATE: 79 BPM | DIASTOLIC BLOOD PRESSURE: 79 MMHG

## 2022-03-10 DIAGNOSIS — Z86.010 HISTORY OF COLON POLYPS: ICD-10-CM

## 2022-03-10 DIAGNOSIS — K62.89 PROCTITIS: ICD-10-CM

## 2022-03-10 DIAGNOSIS — K62.5 RECTAL BLEEDING: Primary | ICD-10-CM

## 2022-03-10 DIAGNOSIS — K59.09 OTHER CONSTIPATION: ICD-10-CM

## 2022-03-10 PROBLEM — K59.1 FUNCTIONAL DIARRHEA: Status: RESOLVED | Noted: 2022-01-06 | Resolved: 2022-03-10

## 2022-03-10 PROCEDURE — 99212 OFFICE O/P EST SF 10 MIN: CPT | Performed by: SURGERY

## 2022-03-10 PROCEDURE — G8427 DOCREV CUR MEDS BY ELIG CLIN: HCPCS | Performed by: SURGERY

## 2022-03-10 PROCEDURE — 4004F PT TOBACCO SCREEN RCVD TLK: CPT | Performed by: SURGERY

## 2022-03-10 PROCEDURE — G8484 FLU IMMUNIZE NO ADMIN: HCPCS | Performed by: SURGERY

## 2022-03-10 PROCEDURE — G8417 CALC BMI ABV UP PARAM F/U: HCPCS | Performed by: SURGERY

## 2022-03-10 PROCEDURE — 99214 OFFICE O/P EST MOD 30 MIN: CPT | Performed by: SURGERY

## 2022-03-10 RX ORDER — HYDROCORTISONE 25 MG/G
CREAM TOPICAL
Qty: 1 EACH | Refills: 5 | Status: SHIPPED
Start: 2022-03-10 | End: 2022-04-24

## 2022-03-10 NOTE — PLAN OF CARE
Date: 3-10-22       GEOVANNA spoke with Mr. Claudio Arce. He will talk to his employer and call us back next week for a start time and date.     Electronically signed by TR Aj LSW on 3/10/2022 at 3:20 PM

## 2022-03-10 NOTE — Clinical Note
See my office note from today on your patient. Thanks!!     Electronically signed by Alison Vee MD on 3/10/2022 at 4:43 PM

## 2022-03-10 NOTE — PATIENT INSTRUCTIONS
Dr. Carney Drivers recommended the followin. Rectal Bleeding  - most likely secondary to proctitis (irritation of the anal canal). Start Sitz bath or warm soak in tube for 30 minute followed by application of Anusol HC cream to anal area as directed, three times a day and after each BM x 2 weeks then as needed. 2. Irregular Bowel Habits - start the polyethylene glycol powder, 17 gm (1 scoop) in 8 oz of fluid daily to help regulate your bowels. If stools are still hard you can use an over the counter stool softener 1 tablet by mouth twice a day. 3. Tubular Adenoma Polyp Colon - polyp was removed but you are at risk of developing more polyps or even developing colon cancer in the future so you need follow up colonoscopy in 5 years.

## 2022-03-10 NOTE — PROGRESS NOTES
General Surgery Progress Note  Date: 3/10/2022     ASSESSMENT/PLAN:  1. Rectal bleeding - most likely this is secondary to proctitis. I recommended sitz bath as followed by internal application of Anusol HC cream 3 times a day and after each bowel movement x 2 weeks. If the bleeding stopped and he can stop these treatments. The bleeding recurs then he can restart the sitz baths and Anusol HC cream for 1 to 2 weeks. Patient's follow-up with me as needed. 2. Status post removal tubular adenoma the sigmoid colon - I recommended follow-up colonoscopy in 5 years. Patient understood was in agreement. 3. Mild nonspecific sigmoid colitis - I doubt that this represents inflammatory bowel disease. I did not recommend any specific treatment for this but simply observe for now. 4. Irregular bowel habits - I recommend he start the polyethylene glycol that Dr. Patt Ramos had ordered. He does have it at home. Recommend he take it daily. If he does start having diarrhea then he can decrease it to every other day. If after starting the polyethylene glycol, the stools are still hard and he can also add an over-the-counter stool softener. Patient understands. 5. Migratory abdominal pain - this may be due to his bowel irregularity so hopefully starting the polyethylene glycol and regulate his bowel should help with this. 6. Heartburn and indigestion - he does not seem to be having the symptoms now and the EGD was normal.  Patient can use antiacids as needed. Chief Complaint   Patient presents with    Follow Up After Procedure     pt is here to follow up after Colonoscopy and EGD    Abdominal Pain     pt states that yesterday he had increased abdominal pain as well as loose stools     History:  The patient is 34 y.o. male who I saw in the office on 1/6/2022 for complaints of rectal bleeding, intermittent lower abdominal pain, and chronic diarrhea. He was also having problems with heartburn and indigestion.   Recommend EGD and colonoscopy. Patient was scheduled for these procedures on 2/22/2022. Due to the emergency I was unable to do these procedures at the last minute and Dr. Drake Mckoy, my partner, performed the procedures that day. EGD was normal but he did take random biopsy of the descending duodenum to rule out celiac disease. Subsequently his biopsies were normal.  Colonoscopy revealed a 1 cm pedunculated polyp in the sigmoid colon that was removed with snare polypectomy and specimen sent to pathology. There is also some mild sigmoid colitis that was biopsied. Random biopsies were also performed of the terminal ileum, cecum/ascending colon, and transverse colon. Pathology revealed the polyp to be a tubular adenoma. The sigmoid colitis was nonspecific although cannot rule out inflammatory bowel disease. Patient was ordered polyethylene glycol 17 g in 8 ounces of fluid daily to regulate his bowel movements. Patient returns office today to follow-up with me regarding the above endoscopies. Previously the rectal bleeding was characterized as dark red blood in his stool but this is resolved. However he still has intermittent mildly red blood when he wipes. Diarrhea has resolved but now he has constipation with bowel movements every 2 to 3 days. Stools are frequently hard. He also intermittently has migratory abdominal pain. He did not start on the polyethylene glycol. Medications and Allergies were reviewed    Physical Exam:  /79 (Site: Right Upper Arm, Position: Sitting, Cuff Size: Large Adult)   Pulse 79   Temp 97.9 °F (36.6 °C) (Infrared)   Resp 16   Ht 5' 8\" (1.727 m)   Wt 226 lb (102.5 kg)   SpO2 97%   BMI 34.36 kg/m²     Abdomen: Bowel sounds were normal.  The abdomen was soft and non distended. There was no tenderness, guarding, rebound, or rigidity. There was no masses, hepatosplenomegaly, or hernias.     Rectal: There are no external skin tags, hemorrhoids, anal dermatitis, or other inflammation seen. Digital rectal exam was not performed.     Electronically by Gabino Tom MD  on 3/10/2022 at 4:33 PM

## 2022-03-18 ENCOUNTER — OFFICE VISIT (OUTPATIENT)
Dept: FAMILY MEDICINE CLINIC | Age: 32
End: 2022-03-18
Payer: COMMERCIAL

## 2022-03-18 VITALS
DIASTOLIC BLOOD PRESSURE: 71 MMHG | BODY MASS INDEX: 34.4 KG/M2 | WEIGHT: 227 LBS | HEART RATE: 66 BPM | SYSTOLIC BLOOD PRESSURE: 110 MMHG | OXYGEN SATURATION: 97 % | HEIGHT: 68 IN | TEMPERATURE: 97 F

## 2022-03-18 DIAGNOSIS — L73.9 FOLLICULITIS: ICD-10-CM

## 2022-03-18 DIAGNOSIS — Z20.2 POSSIBLE EXPOSURE TO STD: ICD-10-CM

## 2022-03-18 DIAGNOSIS — K59.1 FUNCTIONAL DIARRHEA: ICD-10-CM

## 2022-03-18 DIAGNOSIS — K62.5 RECTAL BLEEDING: ICD-10-CM

## 2022-03-18 DIAGNOSIS — Z20.2 POSSIBLE EXPOSURE TO STD: Primary | ICD-10-CM

## 2022-03-18 PROCEDURE — 36415 COLL VENOUS BLD VENIPUNCTURE: CPT | Performed by: FAMILY MEDICINE

## 2022-03-18 PROCEDURE — 99212 OFFICE O/P EST SF 10 MIN: CPT | Performed by: FAMILY MEDICINE

## 2022-03-18 PROCEDURE — G8427 DOCREV CUR MEDS BY ELIG CLIN: HCPCS | Performed by: FAMILY MEDICINE

## 2022-03-18 PROCEDURE — G8484 FLU IMMUNIZE NO ADMIN: HCPCS | Performed by: FAMILY MEDICINE

## 2022-03-18 PROCEDURE — G8417 CALC BMI ABV UP PARAM F/U: HCPCS | Performed by: FAMILY MEDICINE

## 2022-03-18 PROCEDURE — 4004F PT TOBACCO SCREEN RCVD TLK: CPT | Performed by: FAMILY MEDICINE

## 2022-03-18 PROCEDURE — 99213 OFFICE O/P EST LOW 20 MIN: CPT | Performed by: FAMILY MEDICINE

## 2022-03-18 NOTE — PROGRESS NOTES
[facility]  FAMILY MEDICINE RESIDENCY PROGRAM  DATE OF VISIT : 3/18/2022    Patient : Claudeen Grange   Age : 34 y.o.  : 1990   MRN : [id number]   ______________________________________________________________________    Chief Complaint :   Chief Complaint   Patient presents with    Check-Up       HPI : Claudeen Grange is 34 y.o. male who presented to the clinic today for follow up. Blood in stool:  No further episodes. Had scope testing done with general surgery, 1 polyp removed, rescoping in 5 years. Reports bowels are more normal at this time, has been using GlycoLax. No nausea, vomiting.  skin lesion:  2-3 months, painful area on left side of scrotum. Does not shave. No discharge, dysuria, blood in urine, blood in semen. Female partners, condom usage occasionally, unsure if partner is monogamous. 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          Past Medical History :  Past Medical History:   Diagnosis Date    Cardiac murmur     \"innocent as child\"    History of colon polyps 3/10/2022    2022 colonoscopy - mild nonspecific colitis sigmoid colon, small tubular adenoma removed sigmoid colon, Dr. Sridevi Arnold, [facility], recommend follow-up colonoscopy 5 years     Past Surgical History:   Procedure Laterality Date    COLONOSCOPY N/A 2022    mild nonspecific colitis sigmoid colon, small tubular adenoma removed sigmoid colon, Dr. Sridevi Arnold, [facility]    COLONOSCOPY  2022    mild nonspecific colitis sigmoid colon, small tubular adenoma removed sigmoid colon, Dr. Sridevi Arnold, [facility]    UPPER GASTROINTESTINAL ENDOSCOPY N/A 2022    Normal, random biopsies descending duodenum normal, Dr. Sridevi Arnold, [address] [place] [address]      general anesthesia, tolerated well         Review of Systems :    ROS - Per HPI   ______________________________________________________________________    Physical Exam :    Wt Readings from Last 3 Encounters:   03/18/22 227 lb (103 kg)   03/10/22 226 lb (102.5 kg)   02/22/22 225 lb (102.1 kg)       BMI Readings from Last 3 Encounters:   03/18/22 34.52 kg/m²   03/10/22 34.36 kg/m²   02/22/22 34.21 kg/m²   ]      Vitals: /71 (Site: Left Upper Arm, Position: Sitting, Cuff Size: Large Adult)   Pulse 66   Temp 97 °F (36.1 °C) (Temporal)   Ht 5' 8\" (1.727 m)   Wt 227 lb (103 kg)   SpO2 97%   BMI 34.52 kg/m²     Physical Exam  Constitutional:       General: He is not in acute distress. Appearance: He is well-developed. HENT:      Head: Normocephalic and atraumatic. Neck:      Thyroid: No thyromegaly. Trachea: No tracheal deviation. Cardiovascular:      Rate and Rhythm: Normal rate and regular rhythm. Heart sounds: No murmur heard. Pulmonary:      Effort: Pulmonary effort is normal. No respiratory distress. Breath sounds: Normal breath sounds. No wheezing or rales. Abdominal:      General: Bowel sounds are normal. There is no distension. Palpations: Abdomen is soft. Tenderness: There is no abdominal tenderness. There is no guarding. Genitourinary:     Comments: Dr. Stevie Mccarthy present as chaperone. Left scrotum with few millimeters small whitish lesion, slightly raised, minimal tenderness, no active bleeding or drainage  Musculoskeletal:         General: Normal range of motion. Cervical back: Normal range of motion and neck supple. Neurological:      Mental Status: He is alert and oriented to person, place, and time. ______________________________________________________________________    Assessment & Plan :     Diagnosis Orders   1. Possible exposure to STD  HIV Screen    RPR Reflex to Titer and TPPA    C.trachomatis N.gonorrhoeae DNA, Urine    Hepatitis Panel, Acute    HERPES I/II SPECIFIC IGG   2. Folliculitis     3. Rectal bleeding     4.  Functional diarrhea         Concern for STD exposure: Labs as ordered, safe sex practices reviewed  Left scrotal lesion likely folliculitis, less likely abscess or chancre; Labs ordered, continue monitor symptoms, warm compresses  Rectal bleeding/bowel irregularities: Symptoms overall resolved, continue GlycoLax, rescoped in 5 years per general surgery recommendation    Follow up:  Return in about 1 year (around 3/18/2023) for yearly physical.      Ella Lau MD PGY-3    Discussed with: Dr. Kari Garrett

## 2022-03-18 NOTE — PROGRESS NOTES
S: 34 y.o. male here for 6 wk f/u of cscope, 1 polyp removed. No further rectal bleeding. For the last 2-3 mo painful raised area L side of scrotum. Sexually active. H/o GC/Chlam. No B sxs. No discharge or LUTS. O: VS: /71 (Site: Left Upper Arm, Position: Sitting, Cuff Size: Large Adult)   Pulse 66   Temp 97 °F (36.1 °C) (Temporal)   Ht 5' 8\" (1.727 m)   Wt 227 lb (103 kg)   SpO2 97%   BMI 34.52 kg/m²    General: NAD, alert and interacting appropriately. On L scrotum whitish spot 2 mm w/ possible scaling, no surrounding erythema or apparent ttp. No discharge/blood. Impression: STD exposure, scrotal lesion 2/2 folliculitis vs betzaida > abscess vs cyst > chancroid   Plan:   CTM  STD checks    Attending Physician Statement  I have discussed the case, including pertinent history and exam findings with the resident. I also have seen the patient and performed key portions of the examination. I agree with the documented assessment and plan.

## 2022-03-21 LAB
HAV IGM SER IA-ACNC: NORMAL
HEPATITIS B CORE IGM ANTIBODY: NORMAL
HEPATITIS B SURFACE ANTIGEN INTERPRETATION: NORMAL
HEPATITIS C ANTIBODY INTERPRETATION: NORMAL
HIV-1 AND HIV-2 ANTIBODIES: NORMAL
HSV 1 GLYCOPROTEIN G AB IGG: 32.1 IV
HSV 2 GLYCOPROTEIN G AB IGG: 2.91 IV
RPR: NORMAL

## 2022-03-22 LAB
C. TRACHOMATIS DNA ,URINE: NEGATIVE
N. GONORRHOEAE DNA, URINE: NEGATIVE
SOURCE: NORMAL

## 2022-04-01 ENCOUNTER — OFFICE VISIT (OUTPATIENT)
Dept: FAMILY MEDICINE CLINIC | Age: 32
End: 2022-04-01
Payer: COMMERCIAL

## 2022-04-01 VITALS
TEMPERATURE: 99 F | SYSTOLIC BLOOD PRESSURE: 131 MMHG | BODY MASS INDEX: 34.25 KG/M2 | OXYGEN SATURATION: 95 % | HEART RATE: 75 BPM | RESPIRATION RATE: 18 BRPM | HEIGHT: 68 IN | WEIGHT: 226 LBS | DIASTOLIC BLOOD PRESSURE: 76 MMHG

## 2022-04-01 DIAGNOSIS — R89.4 POSITIVE TEST FOR HERPES SIMPLEX VIRUS (HSV) ANTIBODY: ICD-10-CM

## 2022-04-01 DIAGNOSIS — R10.9 FLANK PAIN: ICD-10-CM

## 2022-04-01 DIAGNOSIS — R10.9 FLANK PAIN: Primary | ICD-10-CM

## 2022-04-01 LAB
BACTERIA: ABNORMAL /HPF
BILIRUBIN URINE: NEGATIVE
BILIRUBIN, POC: NORMAL
BLOOD URINE, POC: NORMAL
BLOOD, URINE: NEGATIVE
CLARITY, POC: CLEAR
CLARITY: CLEAR
COLOR, POC: YELLOW
COLOR: YELLOW
GLUCOSE URINE, POC: NORMAL
GLUCOSE URINE: NEGATIVE MG/DL
KETONES, POC: NORMAL
KETONES, URINE: NEGATIVE MG/DL
LEUKOCYTE EST, POC: NORMAL
LEUKOCYTE ESTERASE, URINE: NEGATIVE
NITRITE, POC: NORMAL
NITRITE, URINE: NEGATIVE
PH UA: 6 (ref 5–9)
PH, POC: 6.5
PROTEIN UA: NEGATIVE MG/DL
PROTEIN, POC: NORMAL
RBC UA: ABNORMAL /HPF (ref 0–2)
SPECIFIC GRAVITY UA: 1.02 (ref 1–1.03)
SPECIFIC GRAVITY, POC: >1.03
UROBILINOGEN, POC: 0.2
UROBILINOGEN, URINE: 0.2 E.U./DL
WBC UA: ABNORMAL /HPF (ref 0–5)

## 2022-04-01 PROCEDURE — 99213 OFFICE O/P EST LOW 20 MIN: CPT | Performed by: FAMILY MEDICINE

## 2022-04-01 PROCEDURE — 81002 URINALYSIS NONAUTO W/O SCOPE: CPT | Performed by: FAMILY MEDICINE

## 2022-04-01 PROCEDURE — 99212 OFFICE O/P EST SF 10 MIN: CPT | Performed by: FAMILY MEDICINE

## 2022-04-01 PROCEDURE — 4004F PT TOBACCO SCREEN RCVD TLK: CPT | Performed by: FAMILY MEDICINE

## 2022-04-01 PROCEDURE — G8427 DOCREV CUR MEDS BY ELIG CLIN: HCPCS | Performed by: FAMILY MEDICINE

## 2022-04-01 PROCEDURE — G8417 CALC BMI ABV UP PARAM F/U: HCPCS | Performed by: FAMILY MEDICINE

## 2022-04-01 NOTE — PROGRESS NOTES
48-year-old male here to review STI testing results obtained at last visit. All testing was unremarkable however herpes antibody testing (per patient request) did come back positive for both herpes 1 and 2 virus. At that visit, patient denied any herpetic lesions at that time or in the past.  Did provide patient with images of herpetic lesions during office visit today, upon seeing patient stated that he has had those in the past.  He denies any current lesions any current burning or tingling sensation. He reports no history of cold sores. Has never been treated with Valtrex. Patient reporting left flank pain over the last week or 2. Pain is intermittent, usually worse after work. He works as a  in industrial line. Denies any significant heavy lifting. No blood in his urine. No nausea, vomiting. Tolerating p.o. intake. Blood pressure 131/76, pulse 75, temperature 99 °F (37.2 °C), temperature source Temporal, resp. rate 18, height 5' 8\" (1.727 m), weight 226 lb (102.5 kg), SpO2 95 %. HEENT WNL     Heart regular    Lungs clear    abd non-tender      No edema    Pulses intact   No CVA tenderness bilaterally    Assessment and plan  Positive herpes antibody: Did discuss with patient the interpretation of the results, possibility of flareup in the future. Did give patient symptoms to look out for including burning and tingling prior to, to call office if this occurs and will send Valtrex to pharmacy  Flank pain: Differential including musculoskeletal strain, versus less likely kidney stone. Point-of-care urinalysis within normal limits in office, will send for formal UA and micro in the lab. Continue conservative measures for this time. Return to clinic in 3 months for follow-up    Attending Physician Statement  I have discussed the case, including pertinent history and exam findings with the resident. I agree with the documented assessment and plan.

## 2022-04-01 NOTE — PROGRESS NOTES
[facility]  FAMILY MEDICINE RESIDENCY PROGRAM  DATE OF VISIT : 2022    Patient : Bryant Bose   Age : 34 y.o.  : 1990   MRN : [id number]   ______________________________________________________________________    Chief Complaint :   Chief Complaint   Patient presents with    Results    Flank Pain       HPI : Bryant Bose is 34 y.o. male who presented to the clinic today for above chief complain. here to review STI testing results obtained at last visit. All testing was unremarkable however herpes antibody testing (per patient request) did come back positive for both herpes 1 and 2 virus. At that visit, patient denied any herpetic lesions at that time or in the past.  Did provide patient with images of herpetic lesions during office visit today, upon seeing patient stated that he has had those in the past.  He denies any current lesions any current burning or tingling sensation. He reports no history of cold sores. Has never been treated with Valtrex. Patient reporting left flank pain over the last week or 2. Pain is intermittent, usually worse after work. He works as a  in industrial line. Denies any significant heavy lifting. No blood in his urine. No nausea, vomiting. Tolerating p.o. intake.     Past Medical History :  Past Medical History:   Diagnosis Date    Cardiac murmur     \"innocent as child\"    History of colon polyps 3/10/2022    2022 colonoscopy - mild nonspecific colitis sigmoid colon, small tubular adenoma removed sigmoid colon, Dr. Radha Roa, [facility], recommend follow-up colonoscopy 5 years     Past Surgical History:   Procedure Laterality Date    COLONOSCOPY N/A 2022    mild nonspecific colitis sigmoid colon, small tubular adenoma removed sigmoid colon, Dr. Radha Roa, [id number] COLONOSCOPY  2022    mild nonspecific colitis sigmoid colon, small tubular adenoma removed sigmoid colon, Dr. Radha Roa, [address] [place] [address] GASTROINTESTINAL ENDOSCOPY N/A 02/22/2022    Normal, random biopsies descending duodenum normal, Dr. Michele Diallo, [address]    WISDOM TOOTH EXTRACTION      general anesthesia, tolerated well         Review of Systems :    ROS - Per HPI   ______________________________________________________________________    Physical Exam :    Wt Readings from Last 3 Encounters:   04/01/22 226 lb (102.5 kg)   03/18/22 227 lb (103 kg)   03/10/22 226 lb (102.5 kg)       BMI Readings from Last 3 Encounters:   04/01/22 34.36 kg/m²   03/18/22 34.52 kg/m²   03/10/22 34.36 kg/m²   ]      Vitals: /76 (Site: Left Upper Arm, Position: Sitting, Cuff Size: Large Adult)   Pulse 75   Temp 99 °F (37.2 °C) (Temporal)   Resp 18   Ht 5' 8\" (1.727 m)   Wt 226 lb (102.5 kg)   SpO2 95%   BMI 34.36 kg/m²     Physical Exam  Constitutional:       Appearance: Normal appearance. He is not toxic-appearing. HENT:      Head: Normocephalic and atraumatic. Cardiovascular:      Rate and Rhythm: Normal rate and regular rhythm. Heart sounds: No murmur heard. Pulmonary:      Effort: Pulmonary effort is normal.      Breath sounds: Normal breath sounds. No wheezing or rales. Abdominal:      General: Abdomen is flat. There is no distension. Palpations: Abdomen is soft. Tenderness: There is no abdominal tenderness. There is no right CVA tenderness, left CVA tenderness or guarding. Musculoskeletal:         General: Normal range of motion. Cervical back: Normal range of motion and neck supple. Right lower leg: No edema. Left lower leg: No edema. Neurological:      Mental Status: He is alert. Psychiatric:         Mood and Affect: Mood normal.         Behavior: Behavior normal.         ______________________________________________________________________    Assessment & Plan :     Diagnosis Orders   1. Flank pain  Urinalysis with Microscopic    POCT Urinalysis no Micro   2.  Positive test for herpes simplex virus (HSV) antibody         Positive herpes antibody: Did discuss with patient the interpretation of the results, possibility of flareup in the future. Did give patient symptoms to look out for including burning and tingling prior to, to call office if this occurs and will send Valtrex to pharmacy  Flank pain: Differential including musculoskeletal strain, versus less likely kidney stone. Point-of-care urinalysis within normal limits in office, will send for formal UA and micro in the lab. Continue conservative measures for this time. Follow up:  Return in about 3 months (around 7/1/2022) for follow up of chronic conditions.       Yasmin Barnett MD PGY-3    Discussed with: Dr. Casper Veliz

## 2022-04-24 ENCOUNTER — APPOINTMENT (OUTPATIENT)
Dept: GENERAL RADIOLOGY | Age: 32
End: 2022-04-24
Payer: COMMERCIAL

## 2022-04-24 ENCOUNTER — HOSPITAL ENCOUNTER (EMERGENCY)
Age: 32
Discharge: HOME | End: 2022-04-24
Payer: COMMERCIAL

## 2022-04-24 VITALS
RESPIRATION RATE: 16 BRPM | OXYGEN SATURATION: 97 % | HEART RATE: 62 BPM | DIASTOLIC BLOOD PRESSURE: 64 MMHG | SYSTOLIC BLOOD PRESSURE: 105 MMHG | TEMPERATURE: 97.1 F

## 2022-04-24 DIAGNOSIS — S69.92XA INJURY OF FINGER OF LEFT HAND, INITIAL ENCOUNTER: Primary | ICD-10-CM

## 2022-04-24 PROCEDURE — 99283 EMERGENCY DEPT VISIT LOW MDM: CPT

## 2022-04-24 PROCEDURE — 73130 X-RAY EXAM OF HAND: CPT

## 2022-04-24 RX ORDER — IBUPROFEN 400 MG/1
400 TABLET ORAL ONCE
Status: DISCONTINUED | OUTPATIENT
Start: 2022-04-24 | End: 2022-04-24 | Stop reason: HOSPADM

## 2022-04-24 ASSESSMENT — PAIN DESCRIPTION - ORIENTATION: ORIENTATION: LEFT

## 2022-04-24 ASSESSMENT — ENCOUNTER SYMPTOMS
EYE DISCHARGE: 0
EYE PAIN: 0
ABDOMINAL PAIN: 0
COUGH: 0
SORE THROAT: 0
WHEEZING: 0
SINUS PRESSURE: 0
EYE REDNESS: 0
BACK PAIN: 0
VOMITING: 0
DIARRHEA: 0
SHORTNESS OF BREATH: 0
NAUSEA: 0

## 2022-04-24 ASSESSMENT — PAIN - FUNCTIONAL ASSESSMENT: PAIN_FUNCTIONAL_ASSESSMENT: 0-10

## 2022-04-24 ASSESSMENT — PAIN SCALES - GENERAL: PAINLEVEL_OUTOF10: 6

## 2022-04-24 ASSESSMENT — PAIN DESCRIPTION - FREQUENCY: FREQUENCY: CONTINUOUS

## 2022-04-24 ASSESSMENT — PAIN DESCRIPTION - DESCRIPTORS: DESCRIPTORS: SHOOTING;SHARP

## 2022-04-24 ASSESSMENT — PAIN DESCRIPTION - PAIN TYPE: TYPE: ACUTE PAIN

## 2022-04-24 ASSESSMENT — PAIN DESCRIPTION - ONSET: ONSET: SUDDEN
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
## 2022-06-20 ENCOUNTER — OFFICE VISIT (OUTPATIENT)
Dept: FAMILY MEDICINE CLINIC | Age: 32
End: 2022-06-20
Payer: COMMERCIAL

## 2022-06-20 VITALS
BODY MASS INDEX: 32.74 KG/M2 | SYSTOLIC BLOOD PRESSURE: 132 MMHG | HEART RATE: 62 BPM | RESPIRATION RATE: 18 BRPM | HEIGHT: 68 IN | WEIGHT: 216 LBS | TEMPERATURE: 96.8 F | DIASTOLIC BLOOD PRESSURE: 84 MMHG

## 2022-06-20 DIAGNOSIS — M54.12 CERVICAL RADICULOPATHY: Primary | ICD-10-CM

## 2022-06-20 DIAGNOSIS — M20.012 ACQUIRED MALLET DEFORMITY OF LEFT LITTLE FINGER: ICD-10-CM

## 2022-06-20 PROCEDURE — G8427 DOCREV CUR MEDS BY ELIG CLIN: HCPCS | Performed by: FAMILY MEDICINE

## 2022-06-20 PROCEDURE — G8417 CALC BMI ABV UP PARAM F/U: HCPCS | Performed by: FAMILY MEDICINE

## 2022-06-20 PROCEDURE — 99212 OFFICE O/P EST SF 10 MIN: CPT | Performed by: FAMILY MEDICINE

## 2022-06-20 PROCEDURE — 4004F PT TOBACCO SCREEN RCVD TLK: CPT | Performed by: FAMILY MEDICINE

## 2022-06-20 PROCEDURE — 99213 OFFICE O/P EST LOW 20 MIN: CPT | Performed by: FAMILY MEDICINE
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
## 2022-06-28 ENCOUNTER — HOSPITAL ENCOUNTER (OUTPATIENT)
Age: 32
Discharge: HOME | End: 2022-06-30
Payer: COMMERCIAL

## 2022-06-28 ENCOUNTER — HOSPITAL ENCOUNTER (OUTPATIENT)
Dept: GENERAL RADIOLOGY | Age: 32
Discharge: HOME | End: 2022-06-30
Payer: COMMERCIAL

## 2022-06-28 ENCOUNTER — HOSPITAL ENCOUNTER (OUTPATIENT)
Dept: PHYSICAL THERAPY | Age: 32
Setting detail: THERAPIES SERIES
Discharge: HOME | End: 2022-06-28
Payer: COMMERCIAL

## 2022-06-28 DIAGNOSIS — M54.12 CERVICAL RADICULOPATHY: ICD-10-CM

## 2022-06-28 PROCEDURE — 72040 X-RAY EXAM NECK SPINE 2-3 VW: CPT

## 2022-06-28 PROCEDURE — 97161 PT EVAL LOW COMPLEX 20 MIN: CPT | Performed by: PHYSICAL THERAPIST

## 2022-06-28 ASSESSMENT — PAIN DESCRIPTION - PAIN TYPE: TYPE: CHRONIC PAIN

## 2022-06-28 ASSESSMENT — PAIN DESCRIPTION - LOCATION: LOCATION: NECK

## 2022-06-28 ASSESSMENT — PAIN - FUNCTIONAL ASSESSMENT: PAIN_FUNCTIONAL_ASSESSMENT: PREVENTS OR INTERFERES WITH MANY ACTIVE NOT PASSIVE ACTIVITIES

## 2022-06-28 ASSESSMENT — PAIN DESCRIPTION - ORIENTATION: ORIENTATION: RIGHT;LEFT

## 2022-06-28 ASSESSMENT — PAIN SCALES - GENERAL: PAINLEVEL_OUTOF10: 7

## 2022-06-28 ASSESSMENT — PAIN DESCRIPTION - DESCRIPTORS: DESCRIPTORS: ACHING

## 2022-06-28 NOTE — PROGRESS NOTES
Physical Therapy: Initial Evaluation    Patient: Bart Dutta (35 y.o. male)   Examination Date:   Plan of Care Certification Period: 2022 to        :  1990 ;    Confirmed: Yes MRN: [id number]  CSN: [id number]   Insurance: Payor: Cody Born / Plan: Dale Duvall / Product Type: *No Product type* /   Insurance ID: [id number] - (Medicaid Managed) Secondary Insurance (if applicable):    Referring Physician: Jessica Allison MD     PCP: Darylene Cloud, MD Visits to Date/Visits Approved:     No Show/Cancelled Appts:   /       Medical Diagnosis: Radiculopathy, cervical region [M54.12] cervical radiculopathy  Treatment Diagnosis:       PERTINENT MEDICAL HISTORY           Medical History: Chart Reviewed: Yes   Past Medical History:   Diagnosis Date    Cardiac murmur     \"innocent as child\"    History of colon polyps 3/10/2022    2022 colonoscopy - mild nonspecific colitis sigmoid colon, small tubular adenoma removed sigmoid colon, Dr. Yolette Knutson, [facility], recommend follow-up colonoscopy 5 years     Surgical History:   Past Surgical History:   Procedure Laterality Date    COLONOSCOPY N/A 2022    mild nonspecific colitis sigmoid colon, small tubular adenoma removed sigmoid colon, Dr. Yolette Knutson, [address] COLONOSCOPY  2022    mild nonspecific colitis sigmoid colon, small tubular adenoma removed sigmoid colon, Dr. Yolette Knutson, [address] ENDOSCOPY N/A 2022    Normal, random biopsies descending duodenum normal, Dr. Yolette Knutson, [facility]    WISDOM TOOTH EXTRACTION      general anesthesia, tolerated well       Medications: No current outpatient medications on file. Allergies: Patient has no known allergies. SUBJECTIVE EXAMINATION      ,           Subjective History:  Onset Date: 22  Subjective: pt presents to therapy with c/o neck pain of insidous onset for last 2 months; no PMH for neck/B shoulder injury/sx per pt; no testing for neck on file; no MEDS per pt; c/o N/T into B upper arms, no c/o clicking in the neck with  movement; sleep is fine; no neuro/pain management consults noted; RTD for follow-up in  6 months  Additional Pertinent Hx (if applicable):            Learning/Language: Learning  Does the patient/guardian have any barriers to learning?: No barriers  What is the preferred language of the patient/guardian?: English     Pain Screening    Pain Screening  Patient Currently in Pain: Yes  Pain Assessment: 0-10  Pain Level: 7  Pain Type: Chronic pain  Pain Location: Neck  Pain Orientation: Right,Left  Pain Descriptors: Aching  Functional Pain Assessment: Prevents or interferes with many active not passive activities       OBJECTIVE EXAMINATION     Regional Screen:   UE Screen: AROM WNL for all ranges; B UE strength grossly 5/5 for all planes     Observations:   General Observations  Description: posture:  forward head/rounded shoulders/B protracted scapulae    Palpation:   Cervical Spine Palpation: discomfort noted across cervical paraspinals C2-T4 into B upper traps/scapular retractors      Cervical Assessment     AROM Cervical Spine   Cervical spine general AROM: grossly limited ~ 20% WNL for all ranges with no c/o radiculopathy noted       Special Tests:   Special Tests for Cervical Spine  Special Tests: cervical compression:  neutral/flexed/extended    -/-/-     ASSESSMENT     Impression: Assessment: pain noted across B sides neck/upper back with all prolonged activities, 7/10    Body Structures, Functions, Activity Limitations Requiring Skilled Therapeutic Intervention: Decreased ROM,Decreased endurance    Statement of Medical Necessity: Physical Therapy is both indicated and medically necessary as outlined in the POC to increase the likelihood of meeting the functionally related goals stated below.      Patient's Activity Tolerance: Patient tolerated evaluation without incident      Patient's rehabilitation potential/prognosis is considered to be: Fair,Good    Factors which may impact rehabilitation potential include:          GOALS   Patient Goal(s):    Goals Completed by 3 weeks Goal Status   decrease pain across B sides neck/upper back with all prolonged activities, 0-2/10 New   restore cervical AROM to WNL for all ranges New   improve endurance for all prolonged activities to GOOD+ New   assure I with HEP for home management of condition New             TREATMENT PLAN            Pt. actively involved in establishing Plan of Care and Goals: Yes  Patient/ Caregiver education and instruction:               Treatment may include any combination of the following: ROM,Strengthening,Endurance training,Modalities,Home exercise program     Frequency / Duration:  Patient to be seen pt to be seen 2x/week/3 weeks for   weeks      Eval Complexity:    Decision Making: Low Complexity        Therapist Signature: Tia Dunn, PT    Date: 4/98/5236     I certify that the above Therapy Services are being furnished while the patient is under my care. I agree with the treatment plan and certify that this therapy is necessary. Physician's Signature:  ___________________________   Date:_______                                                                   Clayton Marquis MD        Physician Comments: _______________________________________________    Please sign and return to [facility]. Please fax to the location listed below.  Karel Alaniz for this referral!    [phone]  Dept: [phone]         POC NOTE

## 2022-06-28 NOTE — PROGRESS NOTES
[address] [facility]                Phone: [phone]   Fax: [phone]    Physical Therapy Daily Treatment Note  Date:  2022    Patient Name:  Tierra Butler    :  1990  MRN: [id number]    Evaluating therapist:  ANAMIKA Dockery                (22)  Restrictions/Precautions:    Diagnosis:  cervical radiculopathy  Treatment Diagnosis:    Insurance/Certification information:  [employer]  Referring Physician:  [address] [place] [address] signed (Y/N):    Visit# / total visits:    Pain level: 7/10   Time In:  Time Out:    Subjective:      Exercises:  Exercise/Equipment Resistance/Repetitions Other comments   cervical traction:  15lb/10lb                                    30s/10s            UBE            corner st     upper trap st     chest st     thoracic st              shrugs      scap ret                                                         Other Therapeutic Activities:      Home Exercise Program:  provided 22    Manual Treatments:      Modalities:  IFC/MH to neck/upper back     Timed Code Treatment Minutes: Total Treatment Minutes:      Treatment/Activity Tolerance:  [] Patient tolerated treatment well [] Patient limited by fatique  [] Patient limited by pain  [] Patient limited by other medical complications  [] Other:     Prognosis: [] Good [] Fair  [] Poor    Patient Requires Follow-up: [] Yes  [] No    Plan:   [] Continue per plan of care [] Alter current plan (see comments)  [] Plan of care initiated [] Hold pending MD visit [] Discharge  Plan for Next Session:      See Weekly Progress Note: []  Yes  []  No  Next due:        Electronically signed by:   Gerri Porter PT

## 2022-07-08 ENCOUNTER — HOSPITAL ENCOUNTER (OUTPATIENT)
Dept: PHYSICAL THERAPY | Age: 32
Setting detail: THERAPIES SERIES
Discharge: HOME | End: 2022-07-08
Payer: COMMERCIAL

## 2022-07-08 PROCEDURE — 97012 MECHANICAL TRACTION THERAPY: CPT | Performed by: PHYSICAL THERAPIST

## 2022-07-08 PROCEDURE — G0283 ELEC STIM OTHER THAN WOUND: HCPCS | Performed by: PHYSICAL THERAPIST

## 2022-07-08 PROCEDURE — 97110 THERAPEUTIC EXERCISES: CPT | Performed by: PHYSICAL THERAPIST

## 2022-07-08 NOTE — PROGRESS NOTES
[address] [facility]                Phone: [phone]   Fax: [phone]    Physical Therapy Daily Treatment Note  Date:  2022    Patient Name:  Noam Freitas    :  1990  MRN: [id number]    Evaluating therapist:  ANAMIKA Fiore                (22)  Restrictions/Precautions:    Diagnosis:  cervical radiculopathy  Treatment Diagnosis:    Insurance/Certification information:  [employer]  Referring Physician:  Shashi Herman of care signed (Y/N):    Visit# / total visits:    Pain level: 4/10   Time In:  1052  Time Out:  1150    Subjective:      Exercises:  Exercise/Equipment Resistance/Repetitions Other comments   cervical traction:  15lb/10lb   15 min                                  30s/10s            UBE 3 min F/B           corner st 3x20s    upper trap st 3x20s    chest st 3x20s    thoracic st 3x20s             shrugs  15x3s    scap ret  15x3s                                                       Other Therapeutic Activities:      Home Exercise Program:  provided 22    Manual Treatments:      Modalities:  IFC/MH to neck/upper back x 15 min    Timed Code Treatment Minutes: Total Treatment Minutes:      Treatment/Activity Tolerance:  [] Patient tolerated treatment well [] Patient limited by fatique  [] Patient limited by pain  [] Patient limited by other medical complications  [] Other:     Prognosis: [] Good [] Fair  [] Poor    Patient Requires Follow-up: [] Yes  [] No    Plan:   [] Continue per plan of care [] Alter current plan (see comments)  [] Plan of care initiated [] Hold pending MD visit [] Discharge  Plan for Next Session:      See Weekly Progress Note: []  Yes  []  No  Next due:        Electronically signed by:   Obie Bowles PT

## 2022-07-08 NOTE — PROGRESS NOTES
S:  pt presents to therapy for only scheduled visit for the week; at week's end he reports that his neck/upper back continues to ache with most prolonged activities but has decreased in overall intensity; pain level given as 4/10 is constant in nature and less limiting in nature; HEP going well per pt     O: performed the exercises/treatments as written in the flowsheet for the week ending 7/8/22; initiated HEP for home management of condition; cervical AROM grossly 80%WNL for all ranges; B UE strength grossly 5/5 for all planes     A:  jim tx well; pt able to perform all requested tasks with good form and pacing noted; cervical AROM/B UE strength grossly stable since eval; movement seems smoother and less guarded across all planes; endurance for all prolonged activities is GOOD     P:  cont with POC of stretching/strengthening for neck/upper back with modalities as needed

## 2022-07-13 ENCOUNTER — HOSPITAL ENCOUNTER (OUTPATIENT)
Dept: PHYSICAL THERAPY | Age: 32
Setting detail: THERAPIES SERIES
Discharge: HOME | End: 2022-07-13
Payer: COMMERCIAL

## 2022-07-13 PROCEDURE — 97110 THERAPEUTIC EXERCISES: CPT | Performed by: PHYSICAL THERAPIST

## 2022-07-13 PROCEDURE — G0283 ELEC STIM OTHER THAN WOUND: HCPCS | Performed by: PHYSICAL THERAPIST

## 2022-07-13 PROCEDURE — 97012 MECHANICAL TRACTION THERAPY: CPT | Performed by: PHYSICAL THERAPIST

## 2022-07-13 NOTE — PROGRESS NOTES
[address] [facility]                Phone: [phone]   Fax: [phone]    Physical Therapy Daily Treatment Note  Date:  2022    Patient Name:  Fartun Orellana    :  1990  MRN: [id number]    Evaluating therapist:  ANAMIKA Genao                (22)  Restrictions/Precautions:    Diagnosis:  cervical radiculopathy  Treatment Diagnosis:    Insurance/Certification information:  [employer]  Referring Physician:  Anuradha Grace of care signed (Y/N):    Visit# / total visits:  3/6  Pain level: 10   Time In:  1057  Time Out:  1202    Subjective:      Exercises:  Exercise/Equipment Resistance/Repetitions Other comments   cervical traction:  15lb/10lb   15 min                                  30s/10s            UBE 3 min F/B           corner st 3x20s    upper trap st 3x20s    chest st 3x20s    thoracic st 3x20s             shrugs  15x3s    scap ret  15x3s                                                       Other Therapeutic Activities:      Home Exercise Program:  provided 22    Manual Treatments:      Modalities:  IFC/MH to neck/upper back x 15 min    Timed Code Treatment Minutes: Total Treatment Minutes:      Treatment/Activity Tolerance:  [] Patient tolerated treatment well [] Patient limited by fatique  [] Patient limited by pain  [] Patient limited by other medical complications  [] Other:     Prognosis: [] Good [] Fair  [] Poor    Patient Requires Follow-up: [] Yes  [] No    Plan:   [] Continue per plan of care [] Alter current plan (see comments)  [] Plan of care initiated [] Hold pending MD visit [] Discharge  Plan for Next Session:      See Weekly Progress Note: []  Yes  []  No  Next due:        Electronically signed by:   Phil Garrison PT

## 2022-07-22 ENCOUNTER — HOSPITAL ENCOUNTER (OUTPATIENT)
Dept: PHYSICAL THERAPY | Age: 32
Setting detail: THERAPIES SERIES
Discharge: HOME | End: 2022-07-22
Payer: COMMERCIAL

## 2022-07-22 NOTE — PROGRESS NOTES
[address] [facility]                Phone: [phone]  Fax: [phone]    Physical Therapy  Cancellation/No-show Note  Patient Name:  Daryle Mallet  :  1990   Date:  2022    For today's appointment patient:  [x]  Cancelled  []  Rescheduled appointment  []  No-show     Reason given by patient:  []  Patient ill  []  Conflicting appointment  []  No transportation    []  Conflict with work  [x]  No reason given  []  Other:     Comments:      Electronically signed by:   Glen Quintero PT

## 2022-07-25 ENCOUNTER — HOSPITAL ENCOUNTER (OUTPATIENT)
Dept: PHYSICAL THERAPY | Age: 32
Setting detail: THERAPIES SERIES
Discharge: HOME | End: 2022-07-25
Payer: COMMERCIAL

## 2022-07-25 PROCEDURE — 97110 THERAPEUTIC EXERCISES: CPT | Performed by: PHYSICAL THERAPIST

## 2022-07-25 PROCEDURE — G0283 ELEC STIM OTHER THAN WOUND: HCPCS | Performed by: PHYSICAL THERAPIST

## 2022-07-25 PROCEDURE — 97012 MECHANICAL TRACTION THERAPY: CPT | Performed by: PHYSICAL THERAPIST

## 2022-07-25 NOTE — PROGRESS NOTES
S:  pt presents to therapy for only scheduled visit for the week; at week's end he reports that his neck/upper back feels a bit better overall; pain level given as 4/10 but continues to be  less limiting in nature; HEP going well per pt     O: performed the exercises/treatments as written in the flowsheet for the week ending 7/29/22;  cervical AROM grossly 90%WNL for all ranges; B UE strength grossly 5/5 for all planes     A:  jim tx well; pt able to perform all requested tasks with good form and pacing noted; cervical AROM shows improvement while B UE strength grossly stable since eval; movement once again seems smoother and less guarded across all planes; endurance for all prolonged activities is GOOD/GOOD+     P:  cont with POC of stretching/strengthening for neck/upper back with modalities as needed

## 2022-08-02 ENCOUNTER — HOSPITAL ENCOUNTER (OUTPATIENT)
Dept: PHYSICAL THERAPY | Age: 32
Setting detail: THERAPIES SERIES
Discharge: HOME | End: 2022-08-02
Payer: COMMERCIAL

## 2022-08-02 NOTE — PROGRESS NOTES
[address] [facility]                Phone: [phone]  Fax: [phone]    Physical Therapy  Cancellation/No-show Note  Patient Name:  Anay Us  :  1990   Date:  2022    For today's appointment patient:  [x]  Cancelled  []  Rescheduled appointment  []  No-show     Reason given by patient:  [x]  Patient ill  []  Conflicting appointment  []  No transportation    []  Conflict with work  []  No reason given  []  Other:     Comments:      Electronically signed by:   Tito Aaron, PT

## 2022-08-16 ENCOUNTER — HOSPITAL ENCOUNTER (OUTPATIENT)
Dept: PHYSICAL THERAPY | Age: 32
Setting detail: THERAPIES SERIES
Discharge: HOME | End: 2022-08-16
Payer: COMMERCIAL

## 2022-08-16 PROCEDURE — G0283 ELEC STIM OTHER THAN WOUND: HCPCS | Performed by: PHYSICAL THERAPIST

## 2022-08-16 PROCEDURE — 97110 THERAPEUTIC EXERCISES: CPT | Performed by: PHYSICAL THERAPIST

## 2022-08-16 PROCEDURE — 97012 MECHANICAL TRACTION THERAPY: CPT | Performed by: PHYSICAL THERAPIST

## 2022-08-16 NOTE — PROGRESS NOTES
[address] [facility]                Phone: [phone]   Fax: [phone]    Physical Therapy Daily Treatment Note  Date:  2022    Patient Name:  Mary Villeda    :  1990  MRN: [id number]    Evaluating therapist:  ANAMIKA Jimenez                (22)  Restrictions/Precautions:    Diagnosis:  cervical radiculopathy  Treatment Diagnosis:    Insurance/Certification information:  [employer]  Referring Physician:  Gorge Camara of care signed (Y/N):    Visit# / total visits:    Pain level: 410   Time In:  1113  Time Out:  1206    Subjective:      Exercises:  Exercise/Equipment Resistance/Repetitions Other comments   cervical traction:  15lb/10lb   15 min                                  30s/10s            UBE 3 min F/B           corner st 3x20s    upper trap st 3x20s    chest st 3x20s    thoracic st 3x20s             shrugs  15x3s    scap ret  15x3s                                                       Other Therapeutic Activities:      Home Exercise Program:  provided 22    Manual Treatments:      Modalities:  IFC/MH to neck/upper back x 15 min    Timed Code Treatment Minutes: Total Treatment Minutes:      Treatment/Activity Tolerance:  [] Patient tolerated treatment well [] Patient limited by fatique  [] Patient limited by pain  [] Patient limited by other medical complications  [] Other:     Prognosis: [] Good [] Fair  [] Poor    Patient Requires Follow-up: [] Yes  [] No    Plan:   [] Continue per plan of care [] Alter current plan (see comments)  [] Plan of care initiated [] Hold pending MD visit [] Discharge  Plan for Next Session:      See Weekly Progress Note: []  Yes  []  No  Next due:        Electronically signed by:   Jessie Coello PT

## 2022-08-30 NOTE — PROGRESS NOTES
[address] [facility]                Phone: [phone]   Fax: [phone]    To: Dr. Barraza [facility]       From: Jacob Aguilar PT,MPT      Patient: Leticia Sanchez       : 1990  Diagnosis:       Date: 2022  Treatment Diagnosis:  cervical radiculopathy     Physical Therapy Discharge Note    Total Visits to date:  5  Cancels/No-shows to date:      Plan of Care/Treatment to date:  [x] Therapeutic Exercise    [x] Modalities:  [x] Therapeutic Activity     [] Ultrasound  [x] Electrical Stimulation  [] Gait Training      [] Cervical Traction   [] Lumbar Traction  [] Neuromuscular Re-education  [x] Cold/hotpack [] Iontophoresis  [x] Instruction in HEP      Other:  [] Manual Therapy       []    [] Aquatic Therapy       []                            Progress towards goals:  pt reached all stated functional LTG's for therapy and was I with [facility] for home management of condition       Goal Status:  [x] Achieved [] Partially Achieved  [] Not Achieved     Patient Status:              [] Continue per initial plan of Care     [x] Patient now discharged to [facility] for home management of condition     [] Additional visits requested, Please re-certify for additional visits:          Electronically signed by: Jacob Aguilar PT ,ANAMIKA     If you have any questions or concerns, please don't hesitate to call.   Thank you for your referral.

## 2022-10-12 ENCOUNTER — NURSE TRIAGE (OUTPATIENT)
Dept: OTHER | Facility: CLINIC | Age: 32
End: 2022-10-12

## 2022-10-12 NOTE — TELEPHONE ENCOUNTER
Location of patient: [facility] Bhavani Couch call from Kaila Parks at [employer] with [employer]. Subjective: Caller states \"lump on left wrist. I talked to the  About it and said it is a cyst.\"     Current Symptoms: lump on wrist; it is getting bigger and harder; will create numbness in arm    Onset: 1 year ago;     Associated Symptoms: NA    Pain Severity:  will get a sharp pain in wrist      Temperature:   Denies Fever    What has been tried:     LMP: NA Pregnant: NA    Recommended disposition: See PCP within 3 Days    Care advice provided, patient verbalizes understanding; denies any other questions or concerns; instructed to call back for any new or worsening symptoms. Patient/Caller agrees with recommended disposition; writer provided warm transfer to Margarita at [employer] for appointment scheduling    Attention Provider: Thank you for allowing me to participate in the care of your patient. The patient was connected to triage in response to information provided to the ECC/PSC. Please do not respond through this encounter as the response is not directed to a shared pool.         Reason for Disposition   Small swelling or lump present > 1 week    Protocols used: Skin Lump or Localized Swelling-ADULT-[place]

## 2022-10-14 ENCOUNTER — OFFICE VISIT (OUTPATIENT)
Dept: FAMILY MEDICINE CLINIC | Age: 32
End: 2022-10-14
Payer: COMMERCIAL

## 2022-10-14 VITALS
HEIGHT: 68 IN | TEMPERATURE: 97.9 F | SYSTOLIC BLOOD PRESSURE: 124 MMHG | HEART RATE: 70 BPM | DIASTOLIC BLOOD PRESSURE: 80 MMHG | WEIGHT: 207 LBS | OXYGEN SATURATION: 91 % | BODY MASS INDEX: 31.37 KG/M2

## 2022-10-14 DIAGNOSIS — F19.91 HISTORY OF DRUG USE: ICD-10-CM

## 2022-10-14 DIAGNOSIS — K62.5 RECTAL BLEEDING: ICD-10-CM

## 2022-10-14 DIAGNOSIS — M43.22 ACQUIRED CERVICAL SPINE ANKYLOSIS: Primary | ICD-10-CM

## 2022-10-14 DIAGNOSIS — G56.03 CARPAL TUNNEL SYNDROME ON BOTH SIDES: ICD-10-CM

## 2022-10-14 PROCEDURE — G8427 DOCREV CUR MEDS BY ELIG CLIN: HCPCS | Performed by: STUDENT IN AN ORGANIZED HEALTH CARE EDUCATION/TRAINING PROGRAM

## 2022-10-14 PROCEDURE — 99213 OFFICE O/P EST LOW 20 MIN: CPT | Performed by: STUDENT IN AN ORGANIZED HEALTH CARE EDUCATION/TRAINING PROGRAM

## 2022-10-14 PROCEDURE — 4004F PT TOBACCO SCREEN RCVD TLK: CPT | Performed by: STUDENT IN AN ORGANIZED HEALTH CARE EDUCATION/TRAINING PROGRAM

## 2022-10-14 PROCEDURE — G8484 FLU IMMUNIZE NO ADMIN: HCPCS | Performed by: STUDENT IN AN ORGANIZED HEALTH CARE EDUCATION/TRAINING PROGRAM

## 2022-10-14 PROCEDURE — 99212 OFFICE O/P EST SF 10 MIN: CPT | Performed by: STUDENT IN AN ORGANIZED HEALTH CARE EDUCATION/TRAINING PROGRAM

## 2022-10-14 PROCEDURE — G8417 CALC BMI ABV UP PARAM F/U: HCPCS | Performed by: STUDENT IN AN ORGANIZED HEALTH CARE EDUCATION/TRAINING PROGRAM

## 2022-10-14 ASSESSMENT — PATIENT HEALTH QUESTIONNAIRE - PHQ9
SUM OF ALL RESPONSES TO PHQ QUESTIONS 1-9: 0
SUM OF ALL RESPONSES TO PHQ QUESTIONS 1-9: 0
SUM OF ALL RESPONSES TO PHQ9 QUESTIONS 1 & 2: 0
1. LITTLE INTEREST OR PLEASURE IN DOING THINGS: 0
SUM OF ALL RESPONSES TO PHQ QUESTIONS 1-9: 0
2. FEELING DOWN, DEPRESSED OR HOPELESS: 0
SUM OF ALL RESPONSES TO PHQ QUESTIONS 1-9: 0

## 2022-10-14 NOTE — PROGRESS NOTES
CC: Janet Colby is a 34 y.o. yo male here for evaluation of the following medical concerns: Other (Lump on left wrist.)      HPI:    Establish care    Arms and hands paracentesis  Patient has been experiencing numbness and tingling in his arms and hands bilaterally for several months. The symptoms have become more severe in the past few days. This morning patient woke up and could not feel his hands. Sensation eventually returned after several minutes, but the experience frightened him enough to come to the clinic. Patient denies any trauma to his arms or spine. He was incarcerated for a period of time which ended a few months ago. Since then he has been working in [employer] and [employer]. At his job he uses his hands often. He denies any injury at work. He denies any neck pain or arm weakness. Patient has a 2 cm round mobile mass on the left anterior wrist.  He has that for over 3 years, but recently it is grown in size. The mass is hard, ball shape and painless. GI Bleed  Patient has had a history of rectal bleeding. He does not have any current bleeding, but he is planning to see general surgery for colonoscopy. Substance use  Patient has a history of using substances such as oxycodone. Patient says he has a stopped using it and he is able to deal with withdrawal and craving. His only complaint is that he feels his brain is foggy and keeps him from fully concentrating at his job. He is looking for medication he can provide relief for his brain fog. Health Maintenance  Patient is due for flu vaccine, but does not want to get the shot today. Patient denies headache, lightheadedness, visual changes, appetite changes, chest pain, shortness of breath, nausea, vomiting, abdominal pain, bowel or genitourinary symptoms.       ROS negative unless otherwise noted    Vitals:    /80   Pulse 70   Temp 97.9 °F (36.6 °C) (Temporal)   Ht 5' 8\" (1.727 m)   Wt 207 lb (93.9 kg) SpO2 91%   BMI 31.47 kg/m²   Wt Readings from Last 3 Encounters:   10/14/22 207 lb (93.9 kg)   06/20/22 216 lb (98 kg)   04/01/22 226 lb (102.5 kg)       Physical Exam:  Constitutional - alert, well appearing, and in no distress  Eyes - extraocular eye movements intact, left eye normal, right eye normal, no conjunctivitis noted  Neck - supple, no significant adenopathy; thyroid exam: thyroid is normal in size without nodules or tenderness  Respiratory- clear to auscultation, no wheezes, rales or rhonchi, symmetric air entry; no increased work of breathing  Cardiovascular - normal rate, regular rhythm, normal S1, S2, no murmurs, rubs, clicks or gallops; Extremities - no edema noted, a 2 cm x 2 cm round mobile mass on the left anterior wrist noted. Phalen maneuver is positive  Abdomen - soft, nontender, nondistended  Musculoskeletal - gait normal; no clubbing, cyanosis of extremities noted; no joint deformity or swelling noted  Skin - normal coloration and turgor, no rashes, no suspicious skin lesions noted  Neurological - alert, oriented; no obvious CN deficits, normal speech, no obvious focal findings noted  Psychiatric - normal mood, behavior, speech, dress    Assessment / Shaneka Modi was seen today for other. Diagnoses and all orders for this visit:    Acquired cervical spine ankylosis  Carpal tunnel syndrome on both sides  -     Misc. Devices (WRIST BRACE) MISC; Soft neutral position left wrist brace  Size to fit  Dx:  Wrist pain/carpal tunnel syndrome  -     Misc. Devices (WRIST BRACE) MISC;  Soft neutral position right wrist brace  Size to fit  Dx:  Wrist pain/carpal tunnel syndrome  - Will consider x-ray of cervical spine if no improvement seen by next visit  - Will discuss patient's wrist mass in the next visit    History of drug use  -     Carmelo Phillips MD, Addiction Medicine, [place]    Rectal bleeding  -     Patient has establish specialist for colonoscopy last time  - Patient is planning to get his colonoscopy done in near future      RTO: Return in about 3 weeks (around 11/4/2022) for F/U on arm numbness.       This case was discussed with attending physician: Dr. Rosa Borne    An electronic signature was used to authenticate this note.  ---- Kahlil Rodriguez MD on 10/17/2022 at 11:52 PM

## 2022-10-14 NOTE — PROGRESS NOTES
S: 34 y.o. male here for b/l arm and hand numbness of 1 wk. Woke up with this. No trauma. Incarcerated and recently released. Working with hands a lot. No neck pain or arm weakness. Planning for colonoscopy for h/o GIB. Resolved since getting colon polyps. Getting percocet from street. O: VS: /80   Pulse 70   Temp 97.9 °F (36.6 °C) (Temporal)   Ht 5' 8\" (1.727 m)   Wt 207 lb (93.9 kg)   SpO2 91%   BMI 31.47 kg/m²    General: NAD, alert and interacting appropriately. ? Pos spurling (slightly uncomfortable). FROM neck. Pos phalens. Ext:  L wrist large dorsal cyst, bigger recently but present for 3 years. Impression: CTS vs other neuropathy vs w/d effect vs cervical radiculopathy   Plan:   Xray cspine  B/l wrist braces  Consider std w/u, RPR and Hep c and HIV specifically (done in March)  Suboxone referral    Attending Physician Statement  I have discussed the case, including pertinent history and exam findings with the resident. I agree with the documented assessment and plan.

## 2022-11-01 ENCOUNTER — HOSPITAL ENCOUNTER (OUTPATIENT)
Dept: PSYCHIATRY | Age: 32
Setting detail: THERAPIES SERIES
Discharge: HOME | End: 2022-11-01
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
## 2022-11-04 ENCOUNTER — OFFICE VISIT (OUTPATIENT)
Dept: FAMILY MEDICINE CLINIC | Age: 32
End: 2022-11-04
Payer: COMMERCIAL

## 2022-11-04 VITALS
RESPIRATION RATE: 18 BRPM | BODY MASS INDEX: 30.31 KG/M2 | WEIGHT: 200 LBS | OXYGEN SATURATION: 98 % | HEIGHT: 68 IN | HEART RATE: 72 BPM | TEMPERATURE: 97.9 F | SYSTOLIC BLOOD PRESSURE: 114 MMHG | DIASTOLIC BLOOD PRESSURE: 70 MMHG

## 2022-11-04 DIAGNOSIS — R20.0 ARM NUMBNESS: Primary | ICD-10-CM

## 2022-11-04 PROCEDURE — G8417 CALC BMI ABV UP PARAM F/U: HCPCS | Performed by: STUDENT IN AN ORGANIZED HEALTH CARE EDUCATION/TRAINING PROGRAM

## 2022-11-04 PROCEDURE — G8427 DOCREV CUR MEDS BY ELIG CLIN: HCPCS | Performed by: STUDENT IN AN ORGANIZED HEALTH CARE EDUCATION/TRAINING PROGRAM

## 2022-11-04 PROCEDURE — G8484 FLU IMMUNIZE NO ADMIN: HCPCS | Performed by: STUDENT IN AN ORGANIZED HEALTH CARE EDUCATION/TRAINING PROGRAM

## 2022-11-04 PROCEDURE — 99212 OFFICE O/P EST SF 10 MIN: CPT | Performed by: STUDENT IN AN ORGANIZED HEALTH CARE EDUCATION/TRAINING PROGRAM

## 2022-11-04 PROCEDURE — 99213 OFFICE O/P EST LOW 20 MIN: CPT | Performed by: STUDENT IN AN ORGANIZED HEALTH CARE EDUCATION/TRAINING PROGRAM

## 2022-11-04 PROCEDURE — 4004F PT TOBACCO SCREEN RCVD TLK: CPT | Performed by: STUDENT IN AN ORGANIZED HEALTH CARE EDUCATION/TRAINING PROGRAM

## 2022-11-04 NOTE — PROGRESS NOTES
CC: Janet Colby is a 34 y.o. yo male here for evaluation of the following medical concerns: Neck Pain, Other (Arm numbness f/u), and Wrist Problem      HPI:    Arms and hands paracentesis    Patient is coming back for follow-up in his neck, arm, and wrist pain. Patient has been experiencing numbness and tingling in his arms and hands bilaterally for several months. The symptoms have improved since his initial visit approximately 1 month ago. The problem seems to have a started since he began to work in [employer]. At his job he uses his hands often. He denies any injury at work. He denies any neck pain or arm weakness. Patient has a 2 cm round mobile mass on the left anterior wrist.  He has that for over 3 years, but recently it has grown in size. The mass is hard, ball shape and painless. He was given wrist braces, but he has not been using them. He agreed to obtain the wrist braces and started to wearing them. Health Maintenance  Patient declines to get any vaccine. He says he never received them except when he had to COVID vaccines when he was incarcerated. Patient denies headache, lightheadedness, visual changes, appetite changes, chest pain, shortness of breath, nausea, vomiting, abdominal pain, bowel or genitourinary symptoms.     ROS negative unless otherwise noted    Vitals:    /70 (Site: Right Upper Arm, Position: Sitting, Cuff Size: Large Adult)   Pulse 72   Temp 97.9 °F (36.6 °C) (Temporal)   Resp 18   Ht 5' 8\" (1.727 m)   Wt 200 lb (90.7 kg)   SpO2 98%   BMI 30.41 kg/m²   Wt Readings from Last 3 Encounters:   11/04/22 200 lb (90.7 kg)   10/14/22 207 lb (93.9 kg)   06/20/22 216 lb (98 kg)       Physical Exam:  Constitutional - alert, well appearing, and in no distress  Eyes - extraocular eye movements intact, left eye normal, right eye normal, no conjunctivitis noted  Neck - supple, no significant adenopathy; thyroid exam: thyroid is normal in size without nodules or tenderness  Respiratory- clear to auscultation, no wheezes, rales or rhonchi, symmetric air entry; no increased work of breathing  Cardiovascular - normal rate, regular rhythm, normal S1, S2, no murmurs, rubs, clicks or gallops; Extremities - no edema noted, a 2 cm x 2 cm round mobile mass on the left anterior wrist noted (see image). Phalen and Finkelstein maneuvers are positive  Abdomen - soft, nontender, nondistended  Musculoskeletal - gait normal; no clubbing, cyanosis of extremities noted; no joint deformity or swelling noted. Full active and passive ROM in neck observed  Skin - normal coloration and turgor, no rashes, no suspicious skin lesions noted  Neurological - alert, oriented; no obvious CN deficits, normal speech, no obvious focal findings noted  Psychiatric - normal mood, behavior, speech, dress          Assessment / Sussy Montes was seen today for neck pain, other and wrist problem. Diagnoses and all orders for this visit:    Arm numbness  -     Directions provided patient as where to obtain wrist braces  - Patient to start wearing wrist braces  - Provided patient with the neck exercises handout  - Brenda - Edgardo Arnold DO, Physical Medicine and Rehabilitation, [place]  -     Nerve Conduction Test with EMG; Future  -     diclofenac sodium (VOLTAREN) 1 % GEL; Apply 4 g topically 4 times daily      RTO: Return in about 2 months (around 1/4/2023).       This case was discussed with attending physician: Dr. Sedrick Sahu    An electronic signature was used to authenticate this note.  ---- Manvel MD Norma on 11/6/2022 at 9:04 PM

## 2022-11-09 ENCOUNTER — OFFICE VISIT (OUTPATIENT)
Dept: PHYSICAL MEDICINE AND REHAB | Age: 32
End: 2022-11-09
Payer: COMMERCIAL

## 2022-11-09 VITALS — WEIGHT: 200 LBS | HEIGHT: 68 IN | BODY MASS INDEX: 30.31 KG/M2

## 2022-11-09 DIAGNOSIS — G56.03 BILATERAL CARPAL TUNNEL SYNDROME: Primary | ICD-10-CM

## 2022-11-09 PROCEDURE — 95886 MUSC TEST DONE W/N TEST COMP: CPT | Performed by: PHYSICAL MEDICINE & REHABILITATION

## 2022-11-09 PROCEDURE — G8428 CUR MEDS NOT DOCUMENT: HCPCS | Performed by: PHYSICAL MEDICINE & REHABILITATION

## 2022-11-09 PROCEDURE — G8417 CALC BMI ABV UP PARAM F/U: HCPCS | Performed by: PHYSICAL MEDICINE & REHABILITATION

## 2022-11-09 PROCEDURE — G8484 FLU IMMUNIZE NO ADMIN: HCPCS | Performed by: PHYSICAL MEDICINE & REHABILITATION

## 2022-11-09 PROCEDURE — 95910 NRV CNDJ TEST 7-8 STUDIES: CPT | Performed by: PHYSICAL MEDICINE & REHABILITATION

## 2022-11-09 PROCEDURE — 99243 OFF/OP CNSLTJ NEW/EST LOW 30: CPT | Performed by: PHYSICAL MEDICINE & REHABILITATION

## 2022-11-09 NOTE — PROGRESS NOTES
[id number] [facility]  Electrodiagnostic Laboratory  *Accredited by the [address] [place] [address] with exemplary status  [address] [facility] [place]  Phone: [phone]  Fax: [phone]    Referring Provider: Mushtaq Quinn MD  Primary Care Physician: Pato Parikh MD  Patient Name: Katty Feldman  Patient YOB: 1990  Gender: male  BMI: Body mass index is 30.41 kg/m². Height 5' 8\" (1.727 m), weight 200 lb (90.7 kg). 11/9/2022    Reason for Referral: Numbness    Description of clinical problem:   Chief Complaint   Patient presents with    Extremity Pain     none    Numbness     Numbness in the arms and in the tip of fingers. Left is worse than right. 1+ year of symp. Extremity Weakness     Decrease strength at times due to numbness. Sensory NCS      Nerve / Sites Rec. Site Peak Lat PP Amp Segments Distance Velocity Temp. ms µV  cm m/s °C   L Median - Digit II (Antidromic)      Palm Dig II 2.19 35.7 Palm - Dig II 7 46 33.1      Wrist Dig II 4.06* 33.6 Wrist - Dig II 14 42 33.1   R Median - Digit II (Antidromic)      Palm Dig II 2.29 13.3 Palm - Dig II 7 46 33      Wrist Dig II 4.06* 12.5 Wrist - Dig II 14 44 33   L Ulnar - Digit V (Antidromic)      Wrist Dig V 3.33 34.2 Wrist - Dig V 14 55 33.1   L Radial - Anatomical snuff box (Forearm)      Forearm Wrist 2.29 15.4 Forearm - Wrist 10 60 33.2       Motor NCS      Nerve / Sites Muscle Onset Amplitude Segments Distance Velocity Temp.     ms mV  cm m/s °C   L Median - APB      Palm APB 1.98 12.1 Palm - APB   33.2      Wrist APB 3.85 12.2 Wrist - Palm 8 43* 33.2      Elbow APB 8.39 9.6 Elbow - Wrist 22 49 33.2   R Median - APB      Palm APB 2.03 12.3 Palm - APB   33.1      Wrist APB 4.06 12.0 Wrist - Palm 8 39* 33.1      Elbow APB 8.65 10.5 Elbow - Wrist 23 50 33   L Ulnar - ADM      Wrist ADM 2.86 12.8 Wrist - ADM 8  33.2      B. Elbow ADM 7.03 11.7 B. Elbow - Wrist 23 55 33.2      A. Elbow ADM 9.17 11.8 A. Elbow - B. Elbow 10 47 33.1       F  Wave      Nerve Fmin % F    ms %   L Median - APB 28.59 70   L Ulnar - ADM 28.33 70   R Median - APB 30.26 50       EMG      EMG Summary Table     Spontaneous MUAP Recruitment   Muscle Nerve Roots IA Fib PSW Fasc Amp Dur. PPP Pattern   R. Biceps brachii Musculocutaneous C5-C6 N None None None N N N N   R. Triceps brachii Radial C6-C8 N None None None N N N N   R. Pronator teres Median C6-C7 N None None None N N N N   R. First dorsal interosseous Ulnar C8-T1 N None None None N N N N   R. Abductor pollicis brevis Median E0-J9 N None None None N N N N   L. Biceps brachii Musculocutaneous C5-C6 N None None None N N N N   L. Triceps brachii Radial C6-C8 N None None None N N N N   L. Pronator teres Median C6-C7 N None None None N N N N   L. First dorsal interosseous Ulnar C8-T1 N None None None N N N N   L. Abductor pollicis brevis Median T6-Q2 N None None None N N N N        Study Limitations:  none    Summary of Findings:   Nerve conduction studies: The following nerve conduction studies were abnormal:   Bilateral median sensory latency at the wrist is prolonged. Bilateral median motor conduction velocity across the wrist is focally slow. All other nerve conduction studies, as listed in the table were normal in latency, amplitude and conduction velocity. Needle EMG:   Needle EMG was performed using a concentric needle. Observed motor units were normal in amplitude, duration, phases and recruitment and no active denervation signs were seen. Diagnostic Interpretation: This study was abnormal.     Electrodiagnosis: There is electrodiagnostic evidence of a median mononeuroapthy. Location: bilateral at the wrist.   Rebecca Pares: [  ] Axonal   [ X ] Demyelinating  [  ] Mixed axonal and demyelinating     [  ] Sensory [  ] Motor               [ X ] Mixed sensorimotor     [  ] with active denervation       [ X ] without active denervation  Duration: Acute  Severity: moderate  Prognosis: Good.  The prognosis for recovery of demyelinating lesions is good if the cause is alleviated. Previous Study: There is not a prior study for comparison. Follow up EMG is recommended if no surgical intervention and symptoms persist in one year. Technologist: Osito Hargrove  Physician:    Adam Carlson D.O., P.T. Board Certified Physical Medicine and Rehabilitation  Board Certified Electrodiagnostic Medicine      Nerve conduction studies and electromyography were performed according to our laboratory policies and procedures which can be provided upon request. All abnormal values are identified in the table.  Laboratory normal values can also be provided upon request.       Cc: MD Cecy Pimentel MD

## 2022-11-09 NOTE — PATIENT INSTRUCTIONS
Electrodiagnotic Laboratory  Accredited by the [facility] with Exemplary status  [employer], D.O Pleas Epley, D.O. [facility]  [address] [facility] [place]  Phone: [phone]  Fax: [phone]        Today you had an electrodiagnostic exam which included nerve conduction studies (NCS) and electromyography (EMG). This test evaluated the electrical activity of your nerves and muscles to help determine if you have a nerve or muscle disease. This test can help determine the location and type of a nerve or muscle problem. This will help your referring doctor diagnose your condition and determine the appropriate next step in your treatment plan. After your test:    1. There are no long lasting side effects of the test.     2. You may resume your normal activities without restrictions. 3.  Resume any medications that were stopped for the test.     4  If you have sore areas or bruising in your muscles where the needle was placed, apply a cold pack to the sore area for 15-20 minutes three to four times a day as needed for pain. The soreness should go away in about 1-2 days. 5. Your results were provided  Briefly at the end of your test and the final detailed report will be provided to your referring physician, and/or primary care physician and any other parties you requested within 1-2 days of the examination. You may wish to contact your referring provider after a few days to determine what they would like you to do next. 6.  Please call [phone] with any questions or concerns and if you develop increased body temperature/fever, swelling, tenderness, increased pain and/or drainage from the sites where the needle was placed. Thank you for choosing us for your health care needs.

## 2022-11-09 NOTE — PROGRESS NOTES
[facility]  Electrodiagnostic Laboratory  *Accredited by the [address] [place] [address] with exemplary status  [address] [facility] [place]  Phone: [phone]  Fax: [phone]      Date of Examination: 11/09/22  Patient Name: Maryelizabeth Holstein  is a 34y.o. year old male who was seen today regarding   Chief Complaint   Patient presents with    Extremity Pain     none    Numbness     Numbness in the arms and in the tip of fingers. Left is worse than right. 1+ year of symp. Extremity Weakness     Decrease strength at times due to numbness. .  The symptoms started after no known injury. The symptoms are intermittent. Previous workup has included: none. Past Medical History:   Diagnosis Date    Cardiac murmur     \"innocent as child\"    History of colon polyps 3/10/2022    2/22/2022 colonoscopy - mild nonspecific colitis sigmoid colon, small tubular adenoma removed sigmoid colon, Dr. Jeb Resendiz, [facility], recommend follow-up colonoscopy 5 years       Past Surgical History:   Procedure Laterality Date    COLONOSCOPY N/A 02/22/2022    mild nonspecific colitis sigmoid colon, small tubular adenoma removed sigmoid colon, Dr. Jeb Resendiz, [facility]    COLONOSCOPY  02/22/2022    mild nonspecific colitis sigmoid colon, small tubular adenoma removed sigmoid colon, Dr. Jeb Resendiz, [facility] GASTROINTESTINAL ENDOSCOPY N/A 02/22/2022    Normal, random biopsies descending duodenum normal, Dr. Jeb Resendiz, [address] [place] [address]      general anesthesia, tolerated well       There is not family history of neuromuscular conditions. ROS: There has been no associated vision change, hearing change, speech abnormality, swallowing abnormality, or bowel or bladder dysfunction. Physical Exam: General: The patient is in no apparent distress. Height 5' 8\" (1.727 m), weight 200 lb (90.7 kg). MSK: There is no joint effusion, deformity, instability, swelling, erythema or warmth.   AROM is full in the spine and extremities. +tinel bilateral wrists. Large cyst on radial volar left wrist, hard, tender and mobile. Neurologic:  No focal sensorimotor deficit. Reflexes 2+ and symmetric. Gait is normal.    Impression:     1. Bilateral carpal tunnel syndrome        Plan:   EMG is indicated to evaluate the above diagnosis. Orders Placed This Encounter   Procedures    NM NEEDLE EMG EA EXTREMTY W/PARASPINL AREA COMPLETE    NM MOTOR &/SENS 7-8 NRV CNDJ PRECONF ELTRODE LIMB     EMG was done today and showed bilateral median mononeuropathy at the wrist, clinically consistent with carpal tunnel syndrome. The patient was educated about the diagnosis and the prognosis. Recommend neutral wrist splints at h.s., OT and/or carpal tunnel injection and if no improvement after 4-6 weeks of conservative treatments consider orthopedic surgery evaluation. Recommend repeating the EMG in 1 year if symptoms persist.    Advised patient to follow up with referring provider. Thank you for allowing me to participate in the care of your patient.       Sincerely,     Stephan Goodwin DO

## 2022-11-11 DIAGNOSIS — R20.0 ARM NUMBNESS: ICD-10-CM

## 2023-01-31 ENCOUNTER — OFFICE VISIT (OUTPATIENT)
Dept: FAMILY MEDICINE CLINIC | Age: 33
End: 2023-01-31
Payer: COMMERCIAL

## 2023-01-31 VITALS
SYSTOLIC BLOOD PRESSURE: 130 MMHG | HEIGHT: 68 IN | BODY MASS INDEX: 30.77 KG/M2 | TEMPERATURE: 98.1 F | RESPIRATION RATE: 18 BRPM | OXYGEN SATURATION: 98 % | DIASTOLIC BLOOD PRESSURE: 73 MMHG | HEART RATE: 80 BPM | WEIGHT: 203 LBS

## 2023-01-31 DIAGNOSIS — M67.432 GANGLION CYST OF WRIST, LEFT: Primary | ICD-10-CM

## 2023-01-31 DIAGNOSIS — Z00.00 HEALTHCARE MAINTENANCE: ICD-10-CM

## 2023-01-31 DIAGNOSIS — G56.03 CARPAL TUNNEL SYNDROME ON BOTH SIDES: ICD-10-CM

## 2023-01-31 PROCEDURE — G8417 CALC BMI ABV UP PARAM F/U: HCPCS | Performed by: STUDENT IN AN ORGANIZED HEALTH CARE EDUCATION/TRAINING PROGRAM

## 2023-01-31 PROCEDURE — 99213 OFFICE O/P EST LOW 20 MIN: CPT | Performed by: STUDENT IN AN ORGANIZED HEALTH CARE EDUCATION/TRAINING PROGRAM

## 2023-01-31 PROCEDURE — 99212 OFFICE O/P EST SF 10 MIN: CPT | Performed by: STUDENT IN AN ORGANIZED HEALTH CARE EDUCATION/TRAINING PROGRAM

## 2023-01-31 PROCEDURE — 4004F PT TOBACCO SCREEN RCVD TLK: CPT | Performed by: STUDENT IN AN ORGANIZED HEALTH CARE EDUCATION/TRAINING PROGRAM

## 2023-01-31 PROCEDURE — 90471 IMMUNIZATION ADMIN: CPT | Performed by: FAMILY MEDICINE

## 2023-01-31 PROCEDURE — G8484 FLU IMMUNIZE NO ADMIN: HCPCS | Performed by: STUDENT IN AN ORGANIZED HEALTH CARE EDUCATION/TRAINING PROGRAM

## 2023-01-31 PROCEDURE — G8428 CUR MEDS NOT DOCUMENT: HCPCS | Performed by: STUDENT IN AN ORGANIZED HEALTH CARE EDUCATION/TRAINING PROGRAM

## 2023-01-31 RX ORDER — IBUPROFEN 600 MG/1
600 TABLET ORAL 2 TIMES DAILY WITH MEALS
Qty: 120 TABLET | Refills: 0 | Status: SHIPPED | OUTPATIENT
Start: 2023-01-31

## 2023-01-31 SDOH — ECONOMIC STABILITY: TRANSPORTATION INSECURITY
IN THE PAST 12 MONTHS, HAS THE LACK OF TRANSPORTATION KEPT YOU FROM MEDICAL APPOINTMENTS OR FROM GETTING MEDICATIONS?: NO

## 2023-01-31 SDOH — ECONOMIC STABILITY: TRANSPORTATION INSECURITY
IN THE PAST 12 MONTHS, HAS LACK OF TRANSPORTATION KEPT YOU FROM MEETINGS, WORK, OR FROM GETTING THINGS NEEDED FOR DAILY LIVING?: NO

## 2023-01-31 SDOH — ECONOMIC STABILITY: FOOD INSECURITY: WITHIN THE PAST 12 MONTHS, THE FOOD YOU BOUGHT JUST DIDN'T LAST AND YOU DIDN'T HAVE MONEY TO GET MORE.: NEVER TRUE

## 2023-01-31 SDOH — ECONOMIC STABILITY: FOOD INSECURITY: WITHIN THE PAST 12 MONTHS, YOU WORRIED THAT YOUR FOOD WOULD RUN OUT BEFORE YOU GOT MONEY TO BUY MORE.: NEVER TRUE

## 2023-01-31 ASSESSMENT — PATIENT HEALTH QUESTIONNAIRE - PHQ9
SUM OF ALL RESPONSES TO PHQ QUESTIONS 1-9: 0
2. FEELING DOWN, DEPRESSED OR HOPELESS: 0
1. LITTLE INTEREST OR PLEASURE IN DOING THINGS: 0
SUM OF ALL RESPONSES TO PHQ QUESTIONS 1-9: 0
SUM OF ALL RESPONSES TO PHQ9 QUESTIONS 1 & 2: 0

## 2023-01-31 ASSESSMENT — SOCIAL DETERMINANTS OF HEALTH (SDOH): HOW HARD IS IT FOR YOU TO PAY FOR THE VERY BASICS LIKE FOOD, HOUSING, MEDICAL CARE, AND HEATING?: NOT HARD AT ALL

## 2023-01-31 NOTE — PATIENT INSTRUCTIONS
Our Office is MOVING! [facility], [facility]      With all future appointments starting February 27, 2023,   please see us at our [address]:     [address] [place] [address]. Please call our office with any questions. We look forward to caring for you at our new location. Thank you!

## 2023-01-31 NOTE — PROGRESS NOTES
[facility]     CC: Jessica Mann is a 28 y.o. yo male here for evaluation of the following medical concerns: 3 Month Follow-Up and Carpal Tunnel    HPI:    Arms and hands paracentesis    Patient is coming back for follow-up in his neck, arm, and wrist pain. Patient was recently sent for EMG testing. Test showed demyelination of median nerve confirmed patient has moderate carpal tunnel syndrome. His pain overall has improved since he changed his job that requires him excessive wrist movements. He was previously ordered wrist braces. He never got them. He is also has a ganglion cyst in his left wrist.  Cyst is not painful and he has been getting smaller since he change his job the patient still wants it removed. It is a a 2 cm round mobile mass on the left anterior wrist.  He has that for over 3 years. The mass is hard, ball shape and painless. He agreed to obtain the wrist braces and started to wearing them. Health Maintenance  Patient declines flu vaccine but is willing to get Tdap in this visit. ROS negative unless otherwise noted    Vitals:    /73 (Site: Right Upper Arm, Position: Sitting, Cuff Size: Medium Adult)   Pulse 80   Temp 98.1 °F (36.7 °C) (Temporal)   Resp 18   Ht 5' 8\" (1.727 m)   Wt 203 lb (92.1 kg)   SpO2 98%   BMI 30.87 kg/m²   Wt Readings from Last 3 Encounters:   01/31/23 203 lb (92.1 kg)   11/09/22 200 lb (90.7 kg)   11/04/22 200 lb (90.7 kg)       Physical Exam:  Constitutional - alert, well appearing, and in no distress  Respiratory- clear to auscultation, no wheezes, rales or rhonchi, symmetric air entry; no increased work of breathing  Cardiovascular - normal rate, regular rhythm, normal S1, S2, no murmurs, rubs, clicks or gallops; Extremities - no edema noted, a 2 cm x 2 cm round mobile mass on the left anterior wrist noted (see media). Tinel's mildly positive.  Phalen and Finkelstein maneuvers are negative  Abdomen - soft, nontender, nondistended  Musculoskeletal - gait normal; no clubbing, cyanosis of extremities noted; no joint deformity or swelling noted. Full active and passive ROM in neck observed  Skin - normal coloration and turgor, no rashes, no suspicious skin lesions noted  Neurological - alert, oriented; no obvious CN deficits, normal speech, no obvious focal findings noted  Psychiatric - normal mood, behavior, speech, dress      Assessment / Noralyn Massing was seen today for 3 month follow-up and carpal tunnel. Diagnoses and all orders for this visit:    Ganglion cyst of wrist, left  -     Brenda Ventura MD, Orthopaedics (hand & upper extremities), [place]    Carpal tunnel syndrome on both sides  -     ibuprofen (ADVIL;MOTRIN) 600 MG tablet; Take 1 tablet by mouth 2 times daily (with meals)  -     diclofenac sodium (VOLTAREN) 1 % GEL; Apply 4 g topically 4 times daily  -     Misc. Devices (WRIST BRACE) MISC; Soft neutral position left wrist brace, Size to fit,  Dx:  Wrist pain/carpal tunnel syndrome  -     Misc. Devices (WRIST BRACE) MISC; Soft neutral position right wrist brace, Size to fit, Dx:  Wrist pain/carpal tunnel syndrome    Healthcare maintenance  -     Tdap, [employer], (age 8 yrs+), IM    RTO: Return in about 3 months (around 4/30/2023) for F/U on Carpal Tunnel.       This case was discussed with attending physician: Dr. Johnie Rogel    An electronic signature was used to authenticate this note.  ---- Yasir Abbott MD on 2/5/2023 at 12:03 AM

## 2023-01-31 NOTE — PROGRESS NOTES
Attending Physician Statement    S:   Chief Complaint   Patient presents with    3 Month Follow-Up    Carpal Tunnel      33/M who presents to the clinic today for follow-up of bilateral carpal tunnel syndrome. EMGs confirm diagnosis of moderate carpal tunnel syndrome. Symptoms were exacerbated by his previous job but patient has since changed occupations and symptoms have significantly improved. Patient has undergone no formal treatment at this time. O: Blood pressure 130/73, pulse 80, temperature 98.1 °F (36.7 °C), temperature source Temporal, resp. rate 18, height 5' 8\" (1.727 m), weight 203 lb (92.1 kg), SpO2 98 %. Exam:   Heart - RRR   Lungs - clear   MSK - Tinel's mildly positive. Phalen's negative bilaterally. Ganglion cyst.  A: Carpal tunnel syndrome , Ganglion Cyst  P:  Referral to orthopedic hand surgery   Follow-up as ordered    I have discussed the case, including pertinent history and exam findings with the resident. I agree with the documented assessment and plan.        Silvana Evans MD

## 2023-03-24 ENCOUNTER — HOSPITAL ENCOUNTER (EMERGENCY)
Age: 33
Discharge: HOME | End: 2023-03-24
Attending: FAMILY MEDICINE
Payer: COMMERCIAL

## 2023-03-24 VITALS
HEIGHT: 68 IN | SYSTOLIC BLOOD PRESSURE: 119 MMHG | DIASTOLIC BLOOD PRESSURE: 74 MMHG | TEMPERATURE: 97.1 F | OXYGEN SATURATION: 98 % | WEIGHT: 200 LBS | RESPIRATION RATE: 16 BRPM | BODY MASS INDEX: 30.31 KG/M2 | HEART RATE: 62 BPM

## 2023-03-24 DIAGNOSIS — N34.2 URETHRITIS: ICD-10-CM

## 2023-03-24 DIAGNOSIS — K52.9 GASTROENTERITIS: Primary | ICD-10-CM

## 2023-03-24 LAB
BACTERIA URNS QL MICRO: ABNORMAL /HPF
BILIRUB UR QL STRIP: NEGATIVE
CLARITY UR: CLEAR
COLOR UR: YELLOW
EPI CELLS #/AREA URNS HPF: ABNORMAL /HPF
GLUCOSE UR STRIP-MCNC: NEGATIVE MG/DL
HGB UR QL STRIP: NEGATIVE
KETONES UR STRIP-MCNC: ABNORMAL MG/DL
LEUKOCYTE ESTERASE UR QL STRIP: NEGATIVE
NITRITE UR QL STRIP: NEGATIVE
PH UR STRIP: 5.5 [PH] (ref 5–9)
PROT UR STRIP-MCNC: ABNORMAL MG/DL
RBC #/AREA URNS HPF: ABNORMAL /HPF (ref 0–2)
SP GR UR STRIP: >=1.03 (ref 1–1.03)
SPECIMEN SOURCE: NORMAL
UROBILINOGEN UR STRIP-ACNC: 0.2 E.U./DL
WBC #/AREA URNS HPF: ABNORMAL /HPF (ref 0–5)

## 2023-03-24 PROCEDURE — 87491 CHLMYD TRACH DNA AMP PROBE: CPT

## 2023-03-24 PROCEDURE — 6360000002 HC RX W HCPCS: Performed by: FAMILY MEDICINE

## 2023-03-24 PROCEDURE — 87591 N.GONORRHOEAE DNA AMP PROB: CPT

## 2023-03-24 PROCEDURE — 96372 THER/PROPH/DIAG INJ SC/IM: CPT

## 2023-03-24 PROCEDURE — 99284 EMERGENCY DEPT VISIT MOD MDM: CPT

## 2023-03-24 PROCEDURE — 81001 URINALYSIS AUTO W/SCOPE: CPT

## 2023-03-24 RX ORDER — DOXYCYCLINE HYCLATE 100 MG
100 TABLET ORAL 2 TIMES DAILY
Qty: 20 TABLET | Refills: 0 | Status: SHIPPED | OUTPATIENT
Start: 2023-03-24 | End: 2023-04-03

## 2023-03-24 RX ORDER — CEFTRIAXONE 500 MG/1
500 INJECTION, POWDER, FOR SOLUTION INTRAMUSCULAR; INTRAVENOUS ONCE
Status: COMPLETED | OUTPATIENT
Start: 2023-03-24 | End: 2023-03-24

## 2023-03-24 RX ADMIN — CEFTRIAXONE SODIUM 500 MG: 500 INJECTION, POWDER, FOR SOLUTION INTRAMUSCULAR; INTRAVENOUS at 09:38

## 2023-03-24 ASSESSMENT — PAIN - FUNCTIONAL ASSESSMENT: PAIN_FUNCTIONAL_ASSESSMENT: NONE - DENIES PAIN

## 2023-03-24 NOTE — ED PROVIDER NOTES
HPI:  3/24/23,   Time: 9:36 AM EDT         Glenis Fernando is a 28 y.o. male presenting to the ED for a 4-day history of nausea vomiting and diarrhea. His last emesis was yesterday morning and yesterday only had 2 loose bowel movements; today he has had no nausea or vomiting or diarrhea. He also complains of a clear discharge from his penis and dysuria. He denies frequency urgency of urine. He has unprotected intercourse. ROS:   Pertinent positives and negatives are stated within HPI, all other systems reviewed and are negative.  --------------------------------------------- PAST HISTORY ---------------------------------------------  Past Medical History:  has a past medical history of Cardiac murmur and History of colon polyps. Past Surgical History:  has a past surgical history that includes [place] tooth extraction; Upper gastrointestinal endoscopy (N/A, 02/22/2022); Colonoscopy (N/A, 02/22/2022); and Colonoscopy (02/22/2022). Social History:  reports that he has been smoking cigars and cigarettes. He has been smoking an average of .25 packs per day. He has never used smokeless tobacco. He reports current alcohol use of about 7.0 standard drinks per week. He reports that he does not currently use drugs after having used the following drugs: Marijuana Juanetta [place]). Family History: family history includes Other in his father. The patients home medications have been reviewed. Allergies: Patient has no known allergies.     -------------------------------------------------- RESULTS -------------------------------------------------  All laboratory and radiology results have been personally reviewed by myself   LABS:  Results for orders placed or performed during the hospital encounter of 03/24/23   C.trachomatis N.gonorrhoeae DNA, Urine    Specimen: Urine   Result Value Ref Range    Source Urine    Urinalysis   Result Value Ref Range    Color, UA Yellow Straw/Yellow    Clarity, UA Clear Clear Glucose, Ur Negative Negative mg/dL    Bilirubin Urine Negative Negative    Ketones, Urine TRACE (A) Negative mg/dL    Specific Gravity, UA >=1.030 1.005 - 1.030    Blood, Urine Negative Negative    pH, UA 5.5 5.0 - 9.0    Protein, UA TRACE Negative mg/dL    Urobilinogen, Urine 0.2 <2.0 E.U./dL    Nitrite, Urine Negative Negative    Leukocyte Esterase, Urine Negative Negative   Microscopic Urinalysis   Result Value Ref Range    WBC, UA 0-1 0 - 5 /HPF    RBC, UA 1-3 0 - 2 /HPF    Epithelial Cells, UA RARE /HPF    Bacteria, UA RARE (A) None Seen /HPF       RADIOLOGY:  Interpreted by Radiologist.  No orders to display       ------------------------- NURSING NOTES AND VITALS REVIEWED ---------------------------   The nursing notes within the ED encounter and vital signs as below have been reviewed. /74   Pulse 62   Temp 97.1 °F (36.2 °C) (Temporal)   Resp 16   Ht 5' 8\" (1.727 m)   Wt 200 lb (90.7 kg)   SpO2 98%   BMI 30.41 kg/m²   Oxygen Saturation Interpretation: Normal      ---------------------------------------------------PHYSICAL EXAM--------------------------------------    Constitutional/General: Alert and oriented x3, well appearing, non toxic in NAD  Head: NC/AT  Eyes: PERRL, EOMI  Mouth: Oropharynx clear, handling secretions, no trismus  Neck: Supple, full ROM, no meningeal signs  Pulmonary: Lungs clear to auscultation bilaterally, no wheezes, rales, or rhonchi. Not in respiratory distress  Cardiovascular:  Regular rate and rhythm, no murmurs, gallops, or rubs. 2+ distal pulses  Abdomen: Soft, non tender, non distended,   Extremities: Moves all extremities x 4.  Warm and well perfused  Skin: warm and dry without rash  Neurologic: GCS 15,  Psych: Normal Affect      ------------------------------ ED COURSE/MEDICAL DECISION MAKING----------------------  Medications   cefTRIAXone (ROCEPHIN) injection 500 mg (has no administration in time range)         Medical Decision Making:    Straightforward; was

## 2023-03-28 LAB
CHLAMYDIA DNA UR QL NAA+PROBE: NEGATIVE
N GONORRHOEA DNA UR QL NAA+PROBE: NEGATIVE
SPECIMEN SOURCE: NORMAL

## 2023-04-07 ENCOUNTER — TELEPHONE (OUTPATIENT)
Dept: ORTHOPEDIC SURGERY | Age: 33
End: 2023-04-07

## 2023-04-07 DIAGNOSIS — R22.32 MASS OF LEFT WRIST: Primary | ICD-10-CM

## 2023-04-22 ENCOUNTER — HOSPITAL ENCOUNTER (OUTPATIENT)
Dept: MRI IMAGING | Age: 33
End: 2023-04-22
Payer: COMMERCIAL

## 2023-04-22 DIAGNOSIS — R22.32 MASS OF LEFT WRIST: ICD-10-CM

## 2023-04-22 PROCEDURE — 73221 MRI JOINT UPR EXTREM W/O DYE: CPT

## 2023-04-28 ENCOUNTER — OFFICE VISIT (OUTPATIENT)
Dept: FAMILY MEDICINE CLINIC | Age: 33
End: 2023-04-28
Payer: COMMERCIAL

## 2023-04-28 VITALS
DIASTOLIC BLOOD PRESSURE: 68 MMHG | HEART RATE: 68 BPM | TEMPERATURE: 97.2 F | SYSTOLIC BLOOD PRESSURE: 106 MMHG | OXYGEN SATURATION: 94 % | WEIGHT: 202 LBS | BODY MASS INDEX: 30.71 KG/M2

## 2023-04-28 DIAGNOSIS — M67.432 GANGLION CYST OF WRIST, LEFT: Primary | ICD-10-CM

## 2023-04-28 DIAGNOSIS — M72.2 PLANTAR FASCIITIS: ICD-10-CM

## 2023-04-28 PROCEDURE — G8427 DOCREV CUR MEDS BY ELIG CLIN: HCPCS | Performed by: STUDENT IN AN ORGANIZED HEALTH CARE EDUCATION/TRAINING PROGRAM

## 2023-04-28 PROCEDURE — 99213 OFFICE O/P EST LOW 20 MIN: CPT | Performed by: STUDENT IN AN ORGANIZED HEALTH CARE EDUCATION/TRAINING PROGRAM

## 2023-04-28 PROCEDURE — G8417 CALC BMI ABV UP PARAM F/U: HCPCS | Performed by: STUDENT IN AN ORGANIZED HEALTH CARE EDUCATION/TRAINING PROGRAM

## 2023-04-28 PROCEDURE — 4004F PT TOBACCO SCREEN RCVD TLK: CPT | Performed by: STUDENT IN AN ORGANIZED HEALTH CARE EDUCATION/TRAINING PROGRAM

## 2023-04-28 RX ORDER — NAPROXEN 500 MG/1
500 TABLET ORAL 2 TIMES DAILY WITH MEALS
Qty: 56 TABLET | Refills: 0 | Status: SHIPPED | OUTPATIENT
Start: 2023-04-28 | End: 2023-05-26

## 2023-04-28 SDOH — ECONOMIC STABILITY: FOOD INSECURITY: WITHIN THE PAST 12 MONTHS, THE FOOD YOU BOUGHT JUST DIDN'T LAST AND YOU DIDN'T HAVE MONEY TO GET MORE.: NEVER TRUE

## 2023-04-28 SDOH — ECONOMIC STABILITY: INCOME INSECURITY: HOW HARD IS IT FOR YOU TO PAY FOR THE VERY BASICS LIKE FOOD, HOUSING, MEDICAL CARE, AND HEATING?: NOT HARD AT ALL

## 2023-04-28 SDOH — ECONOMIC STABILITY: HOUSING INSECURITY
IN THE LAST 12 MONTHS, WAS THERE A TIME WHEN YOU DID NOT HAVE A STEADY PLACE TO SLEEP OR SLEPT IN A SHELTER (INCLUDING NOW)?: NO

## 2023-04-28 SDOH — ECONOMIC STABILITY: FOOD INSECURITY: WITHIN THE PAST 12 MONTHS, YOU WORRIED THAT YOUR FOOD WOULD RUN OUT BEFORE YOU GOT MONEY TO BUY MORE.: NEVER TRUE

## 2023-04-28 NOTE — PROGRESS NOTES
Patient is a 43-year-old male presenting to the office for follow-up on left hand MRI as well as discussion of his chronic left foot pain. Discussed results of MRI with patient. MRI indicated a ganglionic cyst in the left wrist.  It also indicated cyst might have ruptured recently. Patient is reporting the bump on his wrist has been getting smaller in recent weeks. Patient is interested in removing the cyst at some point later down the road but for now he wants to monitor for changes in his wrist mass. Patient is also complaining about pain in the sole of his left foot. The pain has been going on for over 6 months. Patient initially ignored the pain, but in last 2 weeks pain has become much worse. Patient has been playing basketball in the past.  He states walking long distance is another exacerbating factor. He has used over-the-counter analgesic such as Tylenol as well as leftover 800 mg ibuprofen with little relief. Patient reports mild relief with squeezing sole of his foot. Blood pressure 106/68, pulse 68, temperature 97.2 °F (36.2 °C), temperature source Temporal, weight 202 lb (91.6 kg), SpO2 94 %. HEENT WNL     Heart regular    Lungs clear    abd non-tender      No edema    Pulses intact   MSK point tenderness at the sole of left foot near the distal aspect of calcaneus    Assessment and plan  Planter fasciitis  - Advised RICE  -Home exercises for plantar fasciitis relief  -NSAIDs  -We will consider x-ray of left foot if no changes in 4 weeks  -Follow-up in 4 weeks    Attending Physician Statement  I have discussed the case, including pertinent history and exam findings with the resident. I agree with the documented assessment and plan.
General: Swelling (Ganglionic cyst on the ventral aspect of left wrist) and tenderness (point tenderness at the sole of left foot near the distal aspect of calcaneus) present. Normal range of motion. Cervical back: Normal range of motion and neck supple. Right lower leg: No edema. Left lower leg: No edema. Skin:     Coloration: Skin is not jaundiced. Findings: No rash. Neurological:      General: No focal deficit present. Mental Status: He is alert and oriented to person, place, and time. Cranial Nerves: No cranial nerve deficit. Sensory: No sensory deficit. Motor: No weakness. Psychiatric:         Mood and Affect: Mood normal.         Behavior: Behavior normal.         A / P:  Trixie Treviño was seen today for follow-up. Diagnoses and all orders for this visit:    Ganglion cyst of wrist, left  - Discussed results of MRI imaging  - Cyst has been getting smaller in the past few months, likely due to have been ruptured  - Will consider referring to hand surgeon, but at this point patient wants to wait and see what happens if the cyst on the next few months    Plantar fasciitis  -     naproxen (NAPROSYN) 500 MG tablet; Take 1 tablet by mouth 2 times daily (with meals) for 28 days  - Advised RICE  - Home exercises for plantar fasciitis relief  - NSAIDs  - We will consider x-ray of left foot if no changes in 4 weeks  - Follow-up in 4 weeks    RTO: Return in about 4 weeks (around 5/26/2023).       Patient was discussed with attending physician: Dr. Abby Campos    An electronic signature was used to authenticate this note.  ---- Joseline Ryan MD on 4/28/2023 at 1:02 PM

## 2023-05-25 DIAGNOSIS — M72.2 PLANTAR FASCIITIS: ICD-10-CM

## 2023-05-25 RX ORDER — NAPROXEN 500 MG/1
500 TABLET ORAL 2 TIMES DAILY WITH MEALS
Qty: 56 TABLET | Refills: 0 | Status: SHIPPED | OUTPATIENT
Start: 2023-05-25 | End: 2023-06-22

## 2023-05-25 NOTE — TELEPHONE ENCOUNTER
Last Appointment:  4/28/2023  Future Appointments  5/30/2023  1:20 PM    MD Lencho Wells        [facility]

## 2023-06-15 ENCOUNTER — HOSPITAL ENCOUNTER (EMERGENCY)
Age: 33
Discharge: HOME | End: 2023-06-15
Attending: EMERGENCY MEDICINE
Payer: COMMERCIAL

## 2023-06-15 VITALS
TEMPERATURE: 98.2 F | OXYGEN SATURATION: 99 % | SYSTOLIC BLOOD PRESSURE: 103 MMHG | DIASTOLIC BLOOD PRESSURE: 60 MMHG | RESPIRATION RATE: 16 BRPM | HEART RATE: 55 BPM

## 2023-06-15 DIAGNOSIS — G56.03 BILATERAL CARPAL TUNNEL SYNDROME: ICD-10-CM

## 2023-06-15 DIAGNOSIS — M62.82 NON-TRAUMATIC RHABDOMYOLYSIS: ICD-10-CM

## 2023-06-15 DIAGNOSIS — G62.9 NEUROPATHY: Primary | ICD-10-CM

## 2023-06-15 LAB
ALBUMIN SERPL-MCNC: 4.2 G/DL (ref 3.5–5.2)
ALP SERPL-CCNC: 77 U/L (ref 40–129)
ALT SERPL-CCNC: 34 U/L (ref 0–40)
ANION GAP SERPL CALCULATED.3IONS-SCNC: 8 MMOL/L (ref 7–16)
AST SERPL-CCNC: 41 U/L (ref 0–39)
BILIRUB SERPL-MCNC: <0.2 MG/DL (ref 0–1.2)
BILIRUB UR QL STRIP: NEGATIVE
BUN SERPL-MCNC: 16 MG/DL (ref 6–20)
CALCIUM SERPL-MCNC: 9.3 MG/DL (ref 8.6–10.2)
CHLORIDE SERPL-SCNC: 103 MMOL/L (ref 98–107)
CK SERPL-CCNC: 1080 U/L (ref 20–200)
CLARITY UR: CLEAR
CO2 SERPL-SCNC: 30 MMOL/L (ref 22–29)
COLOR UR: YELLOW
CREAT SERPL-MCNC: 1.3 MG/DL (ref 0.7–1.2)
CRP SERPL HS-MCNC: 0.4 MG/DL (ref 0–0.4)
ERYTHROCYTE [DISTWIDTH] IN BLOOD BY AUTOMATED COUNT: 13.6 FL (ref 11.5–15)
GLUCOSE SERPL-MCNC: 91 MG/DL (ref 74–99)
GLUCOSE UR STRIP-MCNC: NEGATIVE MG/DL
HCT VFR BLD AUTO: 36.5 % (ref 37–54)
HGB BLD-MCNC: 11.5 G/DL (ref 12.5–16.5)
HGB UR QL STRIP: NEGATIVE
KETONES UR STRIP-MCNC: NEGATIVE MG/DL
LEUKOCYTE ESTERASE UR QL STRIP: NEGATIVE
MCH RBC QN AUTO: 27.4 PG (ref 26–35)
MCHC RBC AUTO-ENTMCNC: 31.5 % (ref 32–34.5)
MCV RBC AUTO: 86.9 FL (ref 80–99.9)
NITRITE UR QL STRIP: NEGATIVE
PH UR STRIP: 6.5 [PH] (ref 5–9)
PLATELET # BLD AUTO: 241 E9/L (ref 130–450)
PMV BLD AUTO: 10.2 FL (ref 7–12)
POTASSIUM SERPL-SCNC: 4.5 MMOL/L (ref 3.5–5)
PROT SERPL-MCNC: 7.1 G/DL (ref 6.4–8.3)
PROT UR STRIP-MCNC: NEGATIVE MG/DL
RBC # BLD AUTO: 4.2 E12/L (ref 3.8–5.8)
SODIUM SERPL-SCNC: 141 MMOL/L (ref 132–146)
SP GR UR STRIP: 1.02 (ref 1–1.03)
UROBILINOGEN UR STRIP-ACNC: 0.2 E.U./DL
WBC # BLD: 5.3 E9/L (ref 4.5–11.5)

## 2023-06-15 PROCEDURE — 80053 COMPREHEN METABOLIC PANEL: CPT

## 2023-06-15 PROCEDURE — 99284 EMERGENCY DEPT VISIT MOD MDM: CPT

## 2023-06-15 PROCEDURE — 81003 URINALYSIS AUTO W/O SCOPE: CPT

## 2023-06-15 PROCEDURE — 2580000003 HC RX 258: Performed by: EMERGENCY MEDICINE

## 2023-06-15 PROCEDURE — 82550 ASSAY OF CK (CPK): CPT

## 2023-06-15 PROCEDURE — 85027 COMPLETE CBC AUTOMATED: CPT

## 2023-06-15 PROCEDURE — 86140 C-REACTIVE PROTEIN: CPT

## 2023-06-15 RX ORDER — 0.9 % SODIUM CHLORIDE 0.9 %
1000 INTRAVENOUS SOLUTION INTRAVENOUS ONCE
Status: COMPLETED | OUTPATIENT
Start: 2023-06-15 | End: 2023-06-15

## 2023-06-15 RX ADMIN — SODIUM CHLORIDE 1000 ML: 9 INJECTION, SOLUTION INTRAVENOUS at 02:43

## 2023-06-15 NOTE — DISCHARGE INSTRUCTIONS
Drink plenty of fluids return if vomiting any change in color of your urine for brown to red or any abdominal pain or any headache or neck pain or fevers

## 2023-06-15 NOTE — ED NOTES
Patient aware of needing urine sample at this time. Call light placed within reach and instructed to call when sample can be obtained.      Becky Dela Cruz RN  06/15/23 [id number]

## 2023-06-15 NOTE — ED PROVIDER NOTES
HPI:  6/15/23,   Time: 2:29 AM EDT         Edgardo Guerrero is a 28 y.o. male presenting to the ED for bilateral hand pain, and arm pain ibeginning days ago. The complaint has been persistent, moderate in severity, and worsened by nothing. Patient does lift weights as concerned about possible rhabdomyolysis he says his urine has been clear not brown or red it was related to carpal tunnel syndrome that he lives heavy things at work as well as works out on his own he is no chest pain or trouble breathing no belly pain has no neck pain once again he had carpal tunnel syndrome in the past it is possibility that this is carpal tunnel syndrome again he is neuro vas intact tendons intact neurologic exam is normal no neck pain or    ROS:   Pertinent positives and negatives are stated within HPI, all other systems reviewed and are negative.  --------------------------------------------- PAST HISTORY ---------------------------------------------  Past Medical History:  has a past medical history of Cardiac murmur and History of colon polyps. Past Surgical History:  has a past surgical history that includes [place] tooth extraction; Upper gastrointestinal endoscopy (N/A, 02/22/2022); Colonoscopy (N/A, 02/22/2022); and Colonoscopy (02/22/2022). Social History:  reports that he has been smoking cigarettes. He has been smoking an average of .25 packs per day. He has never used smokeless tobacco. He reports current alcohol use of about 7.0 standard drinks per week. He reports that he does not currently use drugs after having used the following drugs: Marijuana Elizabeth Santa). Family History: family history includes Other in his father. The patients home medications have been reviewed. Allergies: Patient has no known allergies.     -------------------------------------------------- RESULTS -------------------------------------------------  All laboratory and radiology results have been personally reviewed by myself

## 2023-07-17 ENCOUNTER — OFFICE VISIT (OUTPATIENT)
Dept: FAMILY MEDICINE CLINIC | Age: 33
End: 2023-07-17
Payer: COMMERCIAL

## 2023-07-17 VITALS
DIASTOLIC BLOOD PRESSURE: 74 MMHG | TEMPERATURE: 98.1 F | BODY MASS INDEX: 32.43 KG/M2 | HEIGHT: 68 IN | OXYGEN SATURATION: 96 % | SYSTOLIC BLOOD PRESSURE: 113 MMHG | HEART RATE: 73 BPM | RESPIRATION RATE: 18 BRPM | WEIGHT: 214 LBS

## 2023-07-17 DIAGNOSIS — M67.432 GANGLION CYST OF WRIST, LEFT: Primary | ICD-10-CM

## 2023-07-17 DIAGNOSIS — R74.8 ELEVATED CK: ICD-10-CM

## 2023-07-17 DIAGNOSIS — R01.1 MURMUR, CARDIAC: ICD-10-CM

## 2023-07-17 DIAGNOSIS — M72.2 PLANTAR FASCIITIS: ICD-10-CM

## 2023-07-17 DIAGNOSIS — D64.9 ANEMIA, UNSPECIFIED TYPE: ICD-10-CM

## 2023-07-17 PROCEDURE — 4004F PT TOBACCO SCREEN RCVD TLK: CPT | Performed by: STUDENT IN AN ORGANIZED HEALTH CARE EDUCATION/TRAINING PROGRAM

## 2023-07-17 PROCEDURE — G8417 CALC BMI ABV UP PARAM F/U: HCPCS | Performed by: STUDENT IN AN ORGANIZED HEALTH CARE EDUCATION/TRAINING PROGRAM

## 2023-07-17 PROCEDURE — G8428 CUR MEDS NOT DOCUMENT: HCPCS | Performed by: STUDENT IN AN ORGANIZED HEALTH CARE EDUCATION/TRAINING PROGRAM

## 2023-07-17 PROCEDURE — 99213 OFFICE O/P EST LOW 20 MIN: CPT | Performed by: STUDENT IN AN ORGANIZED HEALTH CARE EDUCATION/TRAINING PROGRAM

## 2023-07-19 ENCOUNTER — TELEPHONE (OUTPATIENT)
Dept: CARDIOLOGY | Age: 33
End: 2023-07-19

## 2023-07-31 PROBLEM — E66.8 MODERATE OBESITY: Status: ACTIVE | Noted: 2023-07-31

## 2023-08-01 ENCOUNTER — HOSPITAL ENCOUNTER (OUTPATIENT)
Age: 33
Discharge: HOME | End: 2023-08-01
Payer: COMMERCIAL

## 2023-08-01 DIAGNOSIS — D64.9 ANEMIA, UNSPECIFIED TYPE: ICD-10-CM

## 2023-08-01 DIAGNOSIS — R74.8 ELEVATED CK: ICD-10-CM

## 2023-08-01 LAB
BASOPHILS # BLD: 0.02 K/UL (ref 0–0.2)
BASOPHILS NFR BLD: 0 % (ref 0–2)
EOSINOPHIL # BLD: 0.55 K/UL (ref 0.05–0.5)
EOSINOPHILS RELATIVE PERCENT: 11 % (ref 0–6)
ERYTHROCYTE [DISTWIDTH] IN BLOOD BY AUTOMATED COUNT: 13.4 % (ref 11.5–15)
ERYTHROCYTE [SEDIMENTATION RATE] IN BLOOD BY WESTERGREN METHOD: 5 MM/HR (ref 0–15)
HCT VFR BLD AUTO: 38 % (ref 37–54)
HGB BLD-MCNC: 11.9 G/DL (ref 12.5–16.5)
IMM GRANULOCYTES # BLD AUTO: <0.03 K/UL (ref 0–0.58)
IMM GRANULOCYTES NFR BLD: 0 % (ref 0–5)
LYMPHOCYTES NFR BLD: 2.11 K/UL (ref 1.5–4)
LYMPHOCYTES RELATIVE PERCENT: 43 % (ref 20–42)
MCH RBC QN AUTO: 26.9 PG (ref 26–35)
MCHC RBC AUTO-ENTMCNC: 31.3 G/DL (ref 32–34.5)
MCV RBC AUTO: 86 FL (ref 80–99.9)
MONOCYTES NFR BLD: 0.35 K/UL (ref 0.1–0.95)
MONOCYTES NFR BLD: 7 % (ref 2–12)
NEUTROPHILS NFR BLD: 38 % (ref 43–80)
NEUTS SEG NFR BLD: 1.84 K/UL (ref 1.8–7.3)
PLATELET # BLD AUTO: 271 K/UL (ref 130–450)
PMV BLD AUTO: 10.3 FL (ref 7–12)
RBC # BLD AUTO: 4.42 M/UL (ref 3.8–5.8)
WBC OTHER # BLD: 4.9 K/UL (ref 4.5–11.5)

## 2023-08-01 PROCEDURE — 82550 ASSAY OF CK (CPK): CPT

## 2023-08-01 PROCEDURE — 36415 COLL VENOUS BLD VENIPUNCTURE: CPT

## 2023-08-01 PROCEDURE — 82552 ASSAY OF CPK IN BLOOD: CPT

## 2023-08-01 PROCEDURE — 85652 RBC SED RATE AUTOMATED: CPT

## 2023-08-01 PROCEDURE — 85025 COMPLETE CBC W/AUTO DIFF WBC: CPT

## 2023-08-01 PROCEDURE — 82085 ASSAY OF ALDOLASE: CPT

## 2023-08-03 LAB — ALDOLASE SERPL-CCNC: 4.6 U/L (ref 1.2–7.6)

## 2023-08-04 DIAGNOSIS — K62.5 RECTAL BLEEDING: Primary | ICD-10-CM

## 2023-08-04 LAB
CK MB: 0 % (ref 0–4)
CK-BB: 0 % (ref 0–0)
CK-MACRO TYPE I: 0 % (ref 0–0)
CK-MACRO TYPE II: 0 % (ref 0–0)
CK-MM: 100 % (ref 96–100)
TOTAL CK: 268 U/L (ref 39–308)

## 2023-08-04 NOTE — PROGRESS NOTES
Called patient explained results. Patient continues to endorse blood in his stool. EGD and colonoscopy were done in 2021 with multiple biopsies taken at random places. The results were unremarkable. Patient was advised to have another colonoscopy in 5 years. Medications were given a provider relief for approximately 6 months. After 6 months, patient began to see blood in his stool again. Decision made and collaboration with the patient for a GI referral to further investigate the cause of his GI bleed in addition to his multiple other GI related issues.

## 2023-08-15 ENCOUNTER — HOSPITAL ENCOUNTER (OUTPATIENT)
Dept: CARDIOLOGY | Age: 33
Discharge: HOME | End: 2023-08-15
Payer: COMMERCIAL

## 2023-08-15 DIAGNOSIS — R01.1 MURMUR, CARDIAC: ICD-10-CM

## 2023-08-15 LAB
LV EF: 75 %
LVEF MODALITY: NORMAL

## 2023-08-15 PROCEDURE — 93306 TTE W/DOPPLER COMPLETE: CPT

## 2023-08-23 ENCOUNTER — TELEPHONE (OUTPATIENT)
Dept: GASTROENTEROLOGY | Age: 33
End: 2023-08-23

## 2023-08-23 ENCOUNTER — PREP FOR PROCEDURE (OUTPATIENT)
Dept: GASTROENTEROLOGY | Age: 33
End: 2023-08-23

## 2023-08-23 ENCOUNTER — INITIAL CONSULT (OUTPATIENT)
Dept: GASTROENTEROLOGY | Age: 33
End: 2023-08-23
Payer: COMMERCIAL

## 2023-08-23 VITALS
HEIGHT: 68 IN | BODY MASS INDEX: 28.79 KG/M2 | WEIGHT: 190 LBS | TEMPERATURE: 97 F | OXYGEN SATURATION: 99 % | RESPIRATION RATE: 16 BRPM | HEART RATE: 80 BPM | DIASTOLIC BLOOD PRESSURE: 68 MMHG | SYSTOLIC BLOOD PRESSURE: 117 MMHG

## 2023-08-23 DIAGNOSIS — D64.9 ANEMIA, UNSPECIFIED TYPE: ICD-10-CM

## 2023-08-23 DIAGNOSIS — K62.5 RECTAL BLEEDING: Primary | ICD-10-CM

## 2023-08-23 PROCEDURE — G8417 CALC BMI ABV UP PARAM F/U: HCPCS | Performed by: NURSE PRACTITIONER

## 2023-08-23 PROCEDURE — G8427 DOCREV CUR MEDS BY ELIG CLIN: HCPCS | Performed by: NURSE PRACTITIONER

## 2023-08-23 PROCEDURE — 99202 OFFICE O/P NEW SF 15 MIN: CPT | Performed by: NURSE PRACTITIONER

## 2023-08-23 PROCEDURE — 4004F PT TOBACCO SCREEN RCVD TLK: CPT | Performed by: NURSE PRACTITIONER

## 2023-08-23 RX ORDER — POLYETHYLENE GLYCOL 3350, SODIUM CHLORIDE, POTASSIUM CHLORIDE, SODIUM BICARBONATE, AND SODIUM SULFATE 240; 5.84; 2.98; 6.72; 22.72 G/4L; G/4L; G/4L; G/4L; G/4L
4000 POWDER, FOR SOLUTION ORAL ONCE
Qty: 1 EACH | Refills: 0 | Status: SHIPPED | OUTPATIENT
Start: 2023-08-23 | End: 2023-08-23

## 2023-08-23 NOTE — PROGRESS NOTES
sigmoid colon, small tubular adenoma removed sigmoid colon, Dr. Emily Gomez, [facility]    COLONOSCOPY  02/22/2022    mild nonspecific colitis sigmoid colon, small tubular adenoma removed sigmoid colon, Dr. Emily Gomez, [facility]    UPPER GASTROINTESTINAL ENDOSCOPY N/A 02/22/2022    Normal, random biopsies descending duodenum normal, Dr. Emily Gomez, [address] EXTRACTION      general anesthesia, tolerated well      Family History   Problem Relation Age of Onset    Other Mother         bowel resection        Lab Results   Component Value Date    WBC 4.9 08/01/2023    HGB 11.9 (L) 08/01/2023    HCT 38.0 08/01/2023    MCV 86.0 08/01/2023     08/01/2023      Lab Results   Component Value Date     06/15/2023    K 4.5 06/15/2023     06/15/2023    CO2 30 (H) 06/15/2023    BUN 16 06/15/2023    CREATININE 1.3 (H) 06/15/2023    GLUCOSE 91 06/15/2023    CALCIUM 9.3 06/15/2023    PROT 7.1 06/15/2023    LABALBU 4.2 06/15/2023    BILITOT <0.2 06/15/2023    ALKPHOS 77 06/15/2023    AST 41 (H) 06/15/2023    ALT 34 06/15/2023    LABGLOM >60 06/15/2023    GFRAA >60 09/04/2015                       ASSESSMENT/PLAN:    1. Rectal bleeding  -     CBC with Auto Differential; Future  -     C-Reactive Protein; Future  -     Sedimentation Rate; Future  2. Anemia, unspecified type  -     CBC with Auto Differential; Future  -     C-Reactive Protein; Future  -     Sedimentation Rate; Future    -patients pathology from last year showed focal active colitis. Labs are now showing mild anemia and patient is having random rectal bleeding. We discussed the colonoscopy to rule out IBD. Patient is agreeable to proceed  -will obtain CBC, ESR, and CRP    Return for Follow up after procedures. An electronic signature was used to authenticate this note.     --BRUNO Cunha - CNP

## 2023-08-23 NOTE — TELEPHONE ENCOUNTER
Prior Authorization Form:      DEMOGRAPHICS:                     Patient Name:  Janett Donaldson  Patient :  1990            Insurance:  Payor: Renuka Lloyd / Plan: Vernadine Pares / Product Type: *No Product type* /   Insurance ID Number:    Payer/Plan Subscr  Sex Relation Sub.  Ins. ID Effective Group Num   1. Edgar [place] 1990 Male Self [id number] [facility] [address]         DIAGNOSIS & PROCEDURE:                       Procedure/Operation: colonoscopy           CPT Code: 92996    Diagnosis:  anemia    ICD10 Code: d64.9    Location:  [facility]    Surgeon:  Radha Stuart INFORMATION:                          Date: 12-    Time: tbd              Anesthesia:  MAC/TIVA                                                       Status:  Outpatient        Special Comments:  na       Electronically signed by Brittnee Lopez LPN on  at 4:53 PM

## 2023-10-12 PROBLEM — J44.9 CHRONIC OBSTRUCTIVE PULMONARY DISEASE (HCC): Status: ACTIVE | Noted: 2023-10-12

## 2025-01-24 NOTE — PROGRESS NOTES
Attending Physician Statement    S:   Chief Complaint   Patient presents with    Neck Pain    Other     Arm numbness f/u    Wrist Problem      Patient is a 34year old male with no significant past medical history here for follow up arm numbness. He has been having numbness in his  wrist and hands bilaterally. He says entire  hand goes numb. Increasing over the past few months. He has been working in an [employer] /[employer]. No injury at work. No neck pain or neck stiffness. No weakness in his hands. Has been getting better for the last 3 weeks. Was ordered wrist braces but was not able to get them. Has some neck stiffness. O: Blood pressure 114/70, pulse 72, temperature 97.9 °F (36.6 °C), temperature source Temporal, resp. rate 18, height 5' 8\" (1.727 m), weight 200 lb (90.7 kg), SpO2 98 %. Exam:   Heart - RRR   Lungs - clear   MSK - phalen test positive bilaterally ,   Firm , mobile mass located radial side , palmar wrist   + finkelstein test bilaterally   Full active and passive ROM in neck     A: Numbness and tingling of bilateral hand/wrist  , neck stiffness , ganglion cyst of left   P:  Neck stiffness - home stretches and supportive    Hand paraesthesia - EMG/Nerve conduction study    Wrist braces    Follow-up as ordered    Attending Attestation   I have discussed the case, including pertinent history and exam findings with the resident. I agree with the documented assessment and plan. No

## (undated) DEVICE — Z DISCONTINUED NO SUB IDED TUBING ETCO2 AD L6.5FT NSL ORAL CVD PRNG NONFLARED TIP OVR

## (undated) DEVICE — BITEBLOCK 54FR W/ DENT RIM BLOX

## (undated) DEVICE — SNARE ENDOSCP L240CM SHTH DIA2.4MM LOOP W30MM MIN WRK CHN

## (undated) DEVICE — TRAP POLYP ETRAP

## (undated) DEVICE — CONTAINER SPEC 60ML PH 7NEUTRAL BUFF FRMLN RDY TO USE

## (undated) DEVICE — ELECTRODE PT RET AD L9FT HI MOIST COND ADH HYDRGEL CORDED

## (undated) DEVICE — FORCEPS BX L240CM JAW DIA2.4MM ORNG L CAP W/ NDL DISP RAD

## (undated) DEVICE — GAUZE,SPONGE,POST-OP,4X3,STRL,LF: Brand: MEDLINE

## (undated) DEVICE — CONNECTOR IRRIGATION AUXILIARY H2O JET W/ PRT MTL THRD HYDR

## (undated) DEVICE — DEFENDO AIR WATER SUCTION AND BIOPSY VALVE KIT FOR  OLYMPUS: Brand: DEFENDO AIR/WATER/SUCTION AND BIOPSY VALVE